# Patient Record
Sex: FEMALE | Race: WHITE | Employment: PART TIME | URBAN - METROPOLITAN AREA
[De-identification: names, ages, dates, MRNs, and addresses within clinical notes are randomized per-mention and may not be internally consistent; named-entity substitution may affect disease eponyms.]

---

## 2017-02-21 ENCOUNTER — TELEPHONE (OUTPATIENT)
Dept: PULMONOLOGY | Age: 37
End: 2017-02-21

## 2017-02-23 ENCOUNTER — OFFICE VISIT (OUTPATIENT)
Dept: PULMONOLOGY | Age: 37
End: 2017-02-23

## 2017-02-23 VITALS
OXYGEN SATURATION: 95 % | HEIGHT: 72 IN | SYSTOLIC BLOOD PRESSURE: 102 MMHG | BODY MASS INDEX: 33 KG/M2 | HEART RATE: 82 BPM | DIASTOLIC BLOOD PRESSURE: 60 MMHG | WEIGHT: 243.6 LBS | RESPIRATION RATE: 16 BRPM | TEMPERATURE: 98.2 F

## 2017-02-23 DIAGNOSIS — G47.33 OSA (OBSTRUCTIVE SLEEP APNEA): ICD-10-CM

## 2017-02-23 DIAGNOSIS — I27.20 PULMONARY HYPERTENSION (HCC): ICD-10-CM

## 2017-02-23 DIAGNOSIS — R94.2 ABNORMAL PFTS: ICD-10-CM

## 2017-02-23 DIAGNOSIS — J45.20 MILD INTERMITTENT ASTHMA WITHOUT COMPLICATION: Primary | ICD-10-CM

## 2017-02-23 DIAGNOSIS — Z72.0 TOBACCO USE: ICD-10-CM

## 2017-02-23 DIAGNOSIS — J96.11 CHRONIC RESPIRATORY FAILURE WITH HYPOXIA (HCC): ICD-10-CM

## 2017-02-23 PROCEDURE — G8417 CALC BMI ABV UP PARAM F/U: HCPCS | Performed by: INTERNAL MEDICINE

## 2017-02-23 PROCEDURE — G8484 FLU IMMUNIZE NO ADMIN: HCPCS | Performed by: INTERNAL MEDICINE

## 2017-02-23 PROCEDURE — G8427 DOCREV CUR MEDS BY ELIG CLIN: HCPCS | Performed by: INTERNAL MEDICINE

## 2017-02-23 PROCEDURE — 99214 OFFICE O/P EST MOD 30 MIN: CPT | Performed by: INTERNAL MEDICINE

## 2017-02-23 PROCEDURE — 4004F PT TOBACCO SCREEN RCVD TLK: CPT | Performed by: INTERNAL MEDICINE

## 2017-02-28 ENCOUNTER — HOSPITAL ENCOUNTER (OUTPATIENT)
Dept: NON INVASIVE DIAGNOSTICS | Age: 37
Discharge: HOME | End: 2017-02-28
Attending: INTERNAL MEDICINE | Admitting: INTERNAL MEDICINE

## 2017-02-28 DIAGNOSIS — I27.29 OTHER SECONDARY PULMONARY HYPERTENSION (HCC): ICD-10-CM

## 2017-02-28 LAB
LV EF: 58 %
LVEF MODALITY: NORMAL

## 2017-03-02 ENCOUNTER — OFFICE VISIT (OUTPATIENT)
Dept: CARDIOLOGY CLINIC | Age: 37
End: 2017-03-02

## 2017-03-02 VITALS
WEIGHT: 245.4 LBS | OXYGEN SATURATION: 93 % | BODY MASS INDEX: 33.24 KG/M2 | SYSTOLIC BLOOD PRESSURE: 110 MMHG | HEIGHT: 72 IN | DIASTOLIC BLOOD PRESSURE: 72 MMHG | HEART RATE: 75 BPM

## 2017-03-02 DIAGNOSIS — I47.1 SVT (SUPRAVENTRICULAR TACHYCARDIA) (HCC): ICD-10-CM

## 2017-03-02 DIAGNOSIS — J96.11 CHRONIC RESPIRATORY FAILURE WITH HYPOXIA (HCC): Primary | ICD-10-CM

## 2017-03-02 DIAGNOSIS — R00.0 TACHYCARDIA: ICD-10-CM

## 2017-03-02 PROCEDURE — G8427 DOCREV CUR MEDS BY ELIG CLIN: HCPCS | Performed by: INTERNAL MEDICINE

## 2017-03-02 PROCEDURE — G8417 CALC BMI ABV UP PARAM F/U: HCPCS | Performed by: INTERNAL MEDICINE

## 2017-03-02 PROCEDURE — 93000 ELECTROCARDIOGRAM COMPLETE: CPT | Performed by: INTERNAL MEDICINE

## 2017-03-02 PROCEDURE — 4004F PT TOBACCO SCREEN RCVD TLK: CPT | Performed by: INTERNAL MEDICINE

## 2017-03-02 PROCEDURE — 99214 OFFICE O/P EST MOD 30 MIN: CPT | Performed by: INTERNAL MEDICINE

## 2017-03-02 PROCEDURE — G8484 FLU IMMUNIZE NO ADMIN: HCPCS | Performed by: INTERNAL MEDICINE

## 2017-05-25 ENCOUNTER — OFFICE VISIT (OUTPATIENT)
Dept: PULMONOLOGY | Age: 37
End: 2017-05-25

## 2017-05-25 VITALS
OXYGEN SATURATION: 92 % | SYSTOLIC BLOOD PRESSURE: 112 MMHG | BODY MASS INDEX: 33.46 KG/M2 | HEART RATE: 72 BPM | WEIGHT: 247 LBS | DIASTOLIC BLOOD PRESSURE: 80 MMHG | HEIGHT: 72 IN | RESPIRATION RATE: 16 BRPM | TEMPERATURE: 98.4 F

## 2017-05-25 DIAGNOSIS — G47.33 OSA ON CPAP: Primary | ICD-10-CM

## 2017-05-25 DIAGNOSIS — Z99.89 OSA ON CPAP: Primary | ICD-10-CM

## 2017-05-25 PROCEDURE — G8427 DOCREV CUR MEDS BY ELIG CLIN: HCPCS | Performed by: INTERNAL MEDICINE

## 2017-05-25 PROCEDURE — 99213 OFFICE O/P EST LOW 20 MIN: CPT | Performed by: INTERNAL MEDICINE

## 2017-05-25 PROCEDURE — 4004F PT TOBACCO SCREEN RCVD TLK: CPT | Performed by: INTERNAL MEDICINE

## 2017-05-25 PROCEDURE — G8417 CALC BMI ABV UP PARAM F/U: HCPCS | Performed by: INTERNAL MEDICINE

## 2017-05-25 ASSESSMENT — SLEEP AND FATIGUE QUESTIONNAIRES
HOW LIKELY ARE YOU TO NOD OFF OR FALL ASLEEP WHILE LYING DOWN TO REST IN THE AFTERNOON WHEN CIRCUMSTANCES PERMIT: 0
HOW LIKELY ARE YOU TO NOD OFF OR FALL ASLEEP IN A CAR, WHILE STOPPED FOR A FEW MINUTES IN TRAFFIC: 0
HOW LIKELY ARE YOU TO NOD OFF OR FALL ASLEEP WHILE SITTING AND TALKING TO SOMEONE: 0
HOW LIKELY ARE YOU TO NOD OFF OR FALL ASLEEP WHEN YOU ARE A PASSENGER IN A CAR FOR AN HOUR WITHOUT A BREAK: 0
ESS TOTAL SCORE: 0
HOW LIKELY ARE YOU TO NOD OFF OR FALL ASLEEP WHILE SITTING AND READING: 0
HOW LIKELY ARE YOU TO NOD OFF OR FALL ASLEEP WHILE WATCHING TV: 0
HOW LIKELY ARE YOU TO NOD OFF OR FALL ASLEEP WHILE SITTING INACTIVE IN A PUBLIC PLACE: 0
HOW LIKELY ARE YOU TO NOD OFF OR FALL ASLEEP WHILE SITTING QUIETLY AFTER LUNCH WITHOUT ALCOHOL: 0

## 2017-05-30 ENCOUNTER — OFFICE VISIT (OUTPATIENT)
Dept: PULMONOLOGY | Age: 37
End: 2017-05-30

## 2017-05-30 VITALS
OXYGEN SATURATION: 94 % | HEART RATE: 90 BPM | WEIGHT: 245.6 LBS | HEIGHT: 72 IN | RESPIRATION RATE: 16 BRPM | BODY MASS INDEX: 33.26 KG/M2 | DIASTOLIC BLOOD PRESSURE: 68 MMHG | TEMPERATURE: 98.5 F | SYSTOLIC BLOOD PRESSURE: 92 MMHG

## 2017-05-30 DIAGNOSIS — I27.20 PULMONARY HYPERTENSION (HCC): ICD-10-CM

## 2017-05-30 DIAGNOSIS — J45.20 MILD INTERMITTENT ASTHMA WITHOUT COMPLICATION: Primary | ICD-10-CM

## 2017-05-30 DIAGNOSIS — R94.2 ABNORMAL PFTS: ICD-10-CM

## 2017-05-30 DIAGNOSIS — Z72.0 TOBACCO USE: ICD-10-CM

## 2017-05-30 DIAGNOSIS — J96.11 CHRONIC RESPIRATORY FAILURE WITH HYPOXIA (HCC): ICD-10-CM

## 2017-05-30 PROCEDURE — G8417 CALC BMI ABV UP PARAM F/U: HCPCS | Performed by: INTERNAL MEDICINE

## 2017-05-30 PROCEDURE — 99214 OFFICE O/P EST MOD 30 MIN: CPT | Performed by: INTERNAL MEDICINE

## 2017-05-30 PROCEDURE — G8427 DOCREV CUR MEDS BY ELIG CLIN: HCPCS | Performed by: INTERNAL MEDICINE

## 2017-05-30 PROCEDURE — 4004F PT TOBACCO SCREEN RCVD TLK: CPT | Performed by: INTERNAL MEDICINE

## 2017-10-03 ENCOUNTER — OFFICE VISIT (OUTPATIENT)
Dept: PULMONOLOGY | Age: 37
End: 2017-10-03

## 2017-10-03 ENCOUNTER — TELEPHONE (OUTPATIENT)
Dept: PULMONOLOGY | Age: 37
End: 2017-10-03

## 2017-10-03 VITALS
HEART RATE: 80 BPM | RESPIRATION RATE: 16 BRPM | WEIGHT: 242 LBS | BODY MASS INDEX: 32.78 KG/M2 | TEMPERATURE: 97.8 F | HEIGHT: 72 IN | OXYGEN SATURATION: 94 % | SYSTOLIC BLOOD PRESSURE: 113 MMHG | DIASTOLIC BLOOD PRESSURE: 66 MMHG

## 2017-10-03 DIAGNOSIS — J96.11 CHRONIC RESPIRATORY FAILURE WITH HYPOXIA (HCC): ICD-10-CM

## 2017-10-03 DIAGNOSIS — I27.20 PULMONARY HYPERTENSION (HCC): ICD-10-CM

## 2017-10-03 DIAGNOSIS — J45.20 MILD INTERMITTENT ASTHMA WITHOUT COMPLICATION: Primary | ICD-10-CM

## 2017-10-03 DIAGNOSIS — Z72.0 TOBACCO USE: ICD-10-CM

## 2017-10-03 DIAGNOSIS — Z23 NEED FOR INFLUENZA VACCINATION: ICD-10-CM

## 2017-10-03 PROCEDURE — 99214 OFFICE O/P EST MOD 30 MIN: CPT | Performed by: INTERNAL MEDICINE

## 2017-10-03 PROCEDURE — 4004F PT TOBACCO SCREEN RCVD TLK: CPT | Performed by: INTERNAL MEDICINE

## 2017-10-03 PROCEDURE — 94620 PR PULMONARY STRESS TESTING,SIMPLE: CPT | Performed by: INTERNAL MEDICINE

## 2017-10-03 PROCEDURE — 90688 IIV4 VACCINE SPLT 0.5 ML IM: CPT | Performed by: INTERNAL MEDICINE

## 2017-10-03 PROCEDURE — G8484 FLU IMMUNIZE NO ADMIN: HCPCS | Performed by: INTERNAL MEDICINE

## 2017-10-03 PROCEDURE — G8427 DOCREV CUR MEDS BY ELIG CLIN: HCPCS | Performed by: INTERNAL MEDICINE

## 2017-10-03 PROCEDURE — G8417 CALC BMI ABV UP PARAM F/U: HCPCS | Performed by: INTERNAL MEDICINE

## 2017-10-03 PROCEDURE — G0008 ADMIN INFLUENZA VIRUS VAC: HCPCS | Performed by: INTERNAL MEDICINE

## 2017-10-03 NOTE — MR AVS SNAPSHOT
After Visit Summary             Brissa Douglass   10/3/2017 1:40 PM   Office Visit    Description:  Female : 1980   Provider:  Vern Lau MD   Department:  [facility] Pulmonology Sleep and Critical Care              Your Follow-Up and Future Appointments         Below is a list of your follow-up and future appointments. This may not be a complete list as you may have made appointments directly with providers that we are not aware of or your providers may have made some for you. Please call your providers to confirm appointments. It is important to keep your appointments. Please bring your current insurance card, photo ID, co-pay, and all medication bottles to your appointment. If self-pay, payment is expected at the time of service. Your To-Do List     Future Appointments Provider Department Dept Phone    2017 2:00 PM Gary Bagley [facility] Pulmonology Sleep and Critical Care [phone]    Please arrive 15 minutes prior to appointment, bring photo ID and insurance card. Future Orders Complete By Expires    ECHO Complete 2D W Doppler W Color [[id number] Custom]  2/3/2018 10/3/2018         Information from Your Visit        Department     Name Address Phone Fax    [facility] Pulmonology Sleep and [facility]. [address] [place] [address] Road      You Were Seen for:         Comments    Need for influenza vaccination   [[id number]]         Vital Signs     Blood Pressure Pulse Temperature Respirations Height Weight    113/66 (Site: Left Arm, Position: Sitting, Cuff Size: Medium Adult) 80 97.8 °F (36.6 °C) (Oral) 16 6' (1.829 m) 242 lb (109.8 kg)    Last Menstrual Period Oxygen Saturation Breastfeeding?  Body Mass Index Smoking Status       2017 94% No 32.82 kg/m2 Current Every Day Smoker       Additional Information about your Body Mass Index (BMI) levothyroxine (SYNTHROID) 75 MCG tablet Take 75 mcg by mouth Daily. Calcium-Vitamin D-Vitamin K (VIACTIV PO) Take 1 tablet by mouth 3 times daily. divalproex (DEPAKOTE) 500 MG ER tablet Take 1,000 mg by mouth nightly. budesonide-formoterol (SYMBICORT) 80-4.5 MCG/ACT AERO Inhale 2 puffs into the lungs 2 times daily. FLUoxetine (PROZAC) 20 MG capsule Take 20 mg by mouth daily. Allergies              Diphenoxylate-atropine     Lamictal [Lamotrigine]     Pcn [Penicillins]       We Ordered/Performed the following           INFLUENZA, QUADV, 3 YRS AND OLDER, IM, MDV, 0.5ML (FLUZONE QUADV)          Additional Information        Basic Information     Date Of Birth Sex Race Ethnicity Preferred Language    1980 Female White Non-/Non  English      Problem List as of 10/3/2017  Date Reviewed: 3/2/2017                Chronic respiratory failure with hypoxia (HCC)    FREIDA (obstructive sleep apnea)    Muscle weakness    Shortness of breath    Chondromalacia patella, right    Osteonecrosis of right knee region ([address]    Abnormal PFTs    Hypoxia    Mild intermittent asthma without complication    Encounter to establish care with new doctor    Abnormal radiograph    Diabetes ([address]    COPD (chronic obstructive pulmonary disease) ([facility]    Scoliosis      Immunizations as of 10/3/2017     Name Date    Hepatitis B 9/22/2011    Influenza Vaccine, unspecified formulation 9/22/2011, 11/3/2009    Influenza Virus Vaccine 9/24/2015, 10/7/2014, 10/29/2013    Influenza, [place] Avila, 3 Years and older, IM 10/26/2016    Pneumococcal Polysaccharide ([id number]) 10/27/2015    Td 9/22/2011      Preventive Care        Date Due    Diabetic Foot Exam 1/18/1990    Eye Exam By An Eye Doctor 1/18/1990    Cholesterol Screening 1/18/1990    HIV screening is recommended for all people regardless of risk factors  aged 15-65 years at least once (lifetime) who have never been HIV tested.  1/18/1995 Urine Check For Kidney Problems 1/18/1998    Pap Smear 1/18/2001    Tetanus Combination Vaccine (1 - Tdap) 9/23/2011    Hemoglobin A1C (Test For Long-Term Glucose Control) 9/27/2016    Yearly Flu Vaccine (1) 9/1/2017            MyChart Signup           Our records indicate that you have an active Where[employer] account. You can view your After Visit Summary by going to [email or web address] and logging in with your OpenSpan username and password. If you don't have a OpenSpan username and password but a parent or guardian has access to your record, the parent or guardian should login with their own OpenSpan username and password and access your record to view the After Visit Summary. Additional Information  If you have questions, please contact the physician practice where you receive care. Remember, OpenSpan is NOT to be used for urgent needs. For medical emergencies, dial 911. For questions regarding your [employer]TV[employer] account call [phone]. If you have a clinical question, please call your doctor's office.

## 2017-10-03 NOTE — PROGRESS NOTES
Chief complaint  This is a 40y.o. year old female  who presents with a chief complaint of   Chief Complaint   Patient presents with    Follow-up     SOB       HPI  55-year-old woman with asthma, pulmonary hypertension and obstructive sleep apnea (on CPAP) presents for follow-up. She continues to work at [employer]. She denies shortness of breath with activities. She denies cough. She uses Symbicort twice a day and Atrovent 4 times a day scheduled. She takes Lasix 20 mg daily. Past medical history:   She was admitted to [facility] from 9/23-9/30/15 for asthma exacerbation. She was treated with steroids, nebulizers, antibiotics and pulmonary toilet. Chest imaging showed atelectasis involving most lobes. There was no evidence of pulmonary embolus. She did have a significant oxygen requirement- needing up to 10L NC. This was eventually weaned to 2L. She was discharged home with this.      Past Medical History:   Diagnosis Date    Asthma     Atrial fibrillation (HCC)     Conduct disorder          Diabetes mellitus ([facility]     Diabetes mellitus, type 2 ([facility]     Hypothyroidism     Pneumonia     Scoliosis     Scoliosis 1995    Sleep apnea     CPAP   Bipolar disorder  Schizophrenia  Intermittent explosive disorder  Mental retardation    Past Surgical History:   Procedure Laterality Date    APPENDECTOMY      KNEE ARTHROSCOPY Right 07/01/2016    LUNG SURGERY         Current Outpatient Prescriptions   Medication Sig Dispense Refill    furosemide (LASIX) 20 MG tablet Take 1 tablet by mouth daily 30 tablet 0    promethazine (PHENERGAN) 25 MG tablet Take 1 tablet by mouth every 6 hours as needed for Nausea 5 tablet 0    aspirin 81 MG chewable tablet Take 81 mg by mouth daily      loratadine (CLARITIN) 10 MG tablet Take 10 mg by mouth daily      CYCLAFEM 1/35 1-35 MG-MCG per tablet 1 tablet daily      LORazepam (ATIVAN) 0.5 MG tablet 1 tablet every morning (before breakfast)      metoprolol (LOPRESSOR) 25 MG tablet TAKE ONE (1) TABLET BY MOUTH TWICE A DAY FOR TACHYCARDIA 60 tablet 6    ipratropium (ATROVENT HFA) 17 MCG/ACT inhaler Inhale 2 puffs into the lungs 4 times daily.  therapeutic multivitamin-minerals (THERAGRAN-M) tablet Take 1 tablet by mouth daily.  metFORMIN (GLUCOPHAGE) 500 MG tablet Take 500 mg by mouth 2 times daily (with meals).  docusate sodium (COLACE) 100 MG capsule Take 100 mg by mouth daily.  cloZAPine (CLOZARIL) 25 MG tablet Take 25 mg by mouth daily.  topiramate (TOPAMAX) 50 MG tablet Take 50 mg by mouth nightly.  benztropine (COGENTIN) 0.5 MG tablet Take 0.5 mg by mouth daily.  levothyroxine (SYNTHROID) 75 MCG tablet Take 75 mcg by mouth Daily.  Calcium-Vitamin D-Vitamin K (VIACTIV PO) Take 1 tablet by mouth 3 times daily.  divalproex (DEPAKOTE) 500 MG ER tablet Take 1,000 mg by mouth nightly.  budesonide-formoterol (SYMBICORT) 80-4.5 MCG/ACT AERO Inhale 2 puffs into the lungs 2 times daily.  FLUoxetine (PROZAC) 20 MG capsule Take 20 mg by mouth daily. No current facility-administered medications for this visit. Allergies   Allergen Reactions    Diphenoxylate-Atropine     Lamictal [Lamotrigine]     Pcn [Penicillins]        Family History   Problem Relation Age of Onset    Heart Failure Mother     Depression Father     Emphysema Paternal Grandfather     Liver Disease Maternal Aunt     Diabetes Paternal Grandmother        Social History     Social History    Marital status: Single     Spouse name: N/A    Number of children: N/A    Years of education: N/A     Occupational History    Not on file.      Social History Main Topics    Smoking status: Current Every Day Smoker     Packs/day: 0.50     Years: 15.00     Types: Cigarettes    Smokeless tobacco: Never Used      Comment: 8 cigs per day ( smokes 1 cigs q 2h    Alcohol use No    Drug use: No    Sexual activity: Not Currently     Other Topics Concern    Not on file Social History Narrative       Immunization History   Administered Date(s) Administered    Hepatitis B, unspecified formulation 09/22/2011    Influenza Vaccine, unspecified formulation 11/03/2009, 09/22/2011    Influenza Virus Vaccine 10/29/2013, 10/07/2014, 09/24/2015    Influenza, [place]aila Gonzalez, 3 Years and older, IM 10/26/2016, 10/03/2017    Pneumococcal Polysaccharide ([id number]) 10/27/2015    Td 09/22/2011       ROS:  GENERAL:  No fevers, no chills  RESPIRATORY:  No shortness of breath, no wheezing, no cough      PHYSICAL EXAM:  Vitals:    10/03/17 1359   BP: 113/66   Pulse: 80   Resp: 16   Temp: 97.8 °F (36.6 °C)   SpO2: 94%   on RA    Gen: Well developed; well nourished  Eyes: No scleral icterus. No conjunctival injection. ENT:  Oropharynx clear. External appearance of ears and nose normal.  Neck: Trachea midline. No obvious mass. No visible thyroid enlargement    Respiratory: Clear to auscultation bilaterally, no accessory muscle use  Cardiovascular: Regular rate and rhythm, no appreciable murmurs. No edema. Gastrointestinal: Soft, non-tender. No hernia  Skin: Warm and dry. No rashes or ulcers on visible areas. Normal texture and turgor  Lymphatic: No cervical LAD. No supraclavicular LAD. Musculoskeletal: No cyanosis, clubbing or joint deformity. Psychiatric: Normal mood and affect; exhibits normal insight and judgement     Pulmonary Function Testing (8/31/16)  FVC 2.12 L at 47% predicted ---> 2.4L at 53% predicted  FEV1 1.52 L at 41% predicted ----> 1.77L at 46% predicted  FEV1/FVC ratio at 72% ---->74% predicted  TLC 3.83 L at 64% predicted  VC 2.28L at 52% predicted  ERV 0.43L at 30% predicted  RV/TLC at 40% predicted  DLCO 19.1 at 71% predicted  DLCO/VA 6.52L at 172 % predicted      MIP -30cm H2O  MEP 77cm H2O      Overall: Combined obstructive and restrictive ventilatory defects. The obstruction appears to be mild. There is significant reversal with bronchodilators. Restriction is moderate. 43/17 and a mean    pulmonary arterial pressure of 29 mmHg. It was difficult for the patient to    cooperate with breathing instructions and there was somewhat of a fluctuation    in the mean pressures. I do think that the mean pressure of 29 mmHg is    accurate.    3. Normal oxygen saturations in the superior vena cava at 75% and right    atrium at 68%. The pulmonary arterial oxygen saturation was 73%.    4. Normal cardiac output by thermodilution at 5.79 liters per minute with a    cardiac index of 2.46 liters per kilogram per minute. By [place] Jared the    cardiac output is 7.52 liters per minute with a cardiac index of 3.2 liters    per kilogram per minute. Pulmonary vascular resistance is 193. Lab Results   Component Value Date    WBC 7.7 09/15/2016    HGB 15.2 09/15/2016    HCT 46.1 09/15/2016    MCV 86.4 09/15/2016     09/15/2016       No results found for: BNP    Lab Results   Component Value Date    CREATININE 0.7 09/15/2016    BUN 14 09/15/2016     09/15/2016    K 4.5 09/15/2016    CL 94 (L) 09/15/2016    CO2 31 09/15/2016         Assessment/Plan:40y.o. year old female presents for follow up. Asthma- She will continue Symbicort and Atrovent. Pulmonary hypertension- She will continue Lasix 20 mg daily. She has regular blood tests through her primary care physician. Will obtain echocardiogram in February 2018 (one year follow up). Chronic hypoxic respiratory failure- She has required supplemental oxygen since September 2015. On 6 minute walk test today she did not desaturate below 90%. Advised that she does not need to continue supplemental oxygen. Obstructive sleep apnea- On CPAP. Follows with Dr. Janna Galan. Tobacco use- We discussed the importance of smoking cessation. She is not ready to quit. Need for influenza vaccination- She received the flu vaccine today. She will return for follow-up in 4 months.       Gerry Sweeney MD  [facility] Pulmonology, Critical Care and Sleep

## 2017-10-04 NOTE — TELEPHONE ENCOUNTER
Spoke to Gabe Dancer in Medical Records at Dr Simons [facility] office. Requested the last 3 blood draws ordered by Dr Delfin Eldridge. She said the last blood work ordered by Dr Delfin Eldridge was 4/13/17. I asked that she fax that result and 2 prior. She took the fax number and said she would check with her manager about faxing.

## 2017-10-05 ENCOUNTER — TELEPHONE (OUTPATIENT)
Dept: PULMONOLOGY | Age: 37
End: 2017-10-05

## 2017-10-05 NOTE — TELEPHONE ENCOUNTER
Left message with Keli Cueto at [address] [place] [address]. Asked if Dr Kandace Pena had done a Chem 7 on [place] since January.   She will forward the message and call back with answer

## 2017-11-08 ENCOUNTER — TELEPHONE (OUTPATIENT)
Dept: PULMONOLOGY | Age: 37
End: 2017-11-08

## 2017-12-19 ENCOUNTER — OFFICE VISIT (OUTPATIENT)
Dept: PULMONOLOGY | Age: 37
End: 2017-12-19

## 2017-12-19 VITALS
RESPIRATION RATE: 16 BRPM | WEIGHT: 239 LBS | TEMPERATURE: 97.9 F | DIASTOLIC BLOOD PRESSURE: 70 MMHG | HEIGHT: 72 IN | OXYGEN SATURATION: 93 % | SYSTOLIC BLOOD PRESSURE: 113 MMHG | HEART RATE: 82 BPM | BODY MASS INDEX: 32.37 KG/M2

## 2017-12-19 DIAGNOSIS — G47.33 OSA ON CPAP: Primary | ICD-10-CM

## 2017-12-19 DIAGNOSIS — Z99.89 OSA ON CPAP: Primary | ICD-10-CM

## 2017-12-19 PROCEDURE — G8417 CALC BMI ABV UP PARAM F/U: HCPCS | Performed by: INTERNAL MEDICINE

## 2017-12-19 PROCEDURE — G8484 FLU IMMUNIZE NO ADMIN: HCPCS | Performed by: INTERNAL MEDICINE

## 2017-12-19 PROCEDURE — 4004F PT TOBACCO SCREEN RCVD TLK: CPT | Performed by: INTERNAL MEDICINE

## 2017-12-19 PROCEDURE — G8427 DOCREV CUR MEDS BY ELIG CLIN: HCPCS | Performed by: INTERNAL MEDICINE

## 2017-12-19 PROCEDURE — 99213 OFFICE O/P EST LOW 20 MIN: CPT | Performed by: INTERNAL MEDICINE

## 2017-12-19 ASSESSMENT — SLEEP AND FATIGUE QUESTIONNAIRES
HOW LIKELY ARE YOU TO NOD OFF OR FALL ASLEEP WHILE SITTING AND TALKING TO SOMEONE: 0
HOW LIKELY ARE YOU TO NOD OFF OR FALL ASLEEP IN A CAR, WHILE STOPPED FOR A FEW MINUTES IN TRAFFIC: 0
HOW LIKELY ARE YOU TO NOD OFF OR FALL ASLEEP WHILE WATCHING TV: 0
HOW LIKELY ARE YOU TO NOD OFF OR FALL ASLEEP WHILE SITTING QUIETLY AFTER LUNCH WITHOUT ALCOHOL: 0
HOW LIKELY ARE YOU TO NOD OFF OR FALL ASLEEP WHILE SITTING AND READING: 0
HOW LIKELY ARE YOU TO NOD OFF OR FALL ASLEEP WHILE LYING DOWN TO REST IN THE AFTERNOON WHEN CIRCUMSTANCES PERMIT: 0
ESS TOTAL SCORE: 1
HOW LIKELY ARE YOU TO NOD OFF OR FALL ASLEEP WHEN YOU ARE A PASSENGER IN A CAR FOR AN HOUR WITHOUT A BREAK: 1
HOW LIKELY ARE YOU TO NOD OFF OR FALL ASLEEP WHILE SITTING INACTIVE IN A PUBLIC PLACE: 0

## 2017-12-19 NOTE — PROGRESS NOTES
None     Social History Narrative    None       PHYSICAL EXAM:  GENERAL:  Well nourished, alert, appears stated age, no distress, Cushingoid,  HEENT:  No scleral icterus, no conjunctival irritation, Mallampati IV, narrow pharynx  NECK:  No thyromegaly, no bruits  LYMPH:  No cervical or supraclavicular adenopathy  HEART:  Regular rate and rhythm, no murmurs  LUNGS:  Clear but diminished  ABDOMEN:  No distention, no organomegaly  EXTREMITIES:  No edema, no digital clubbing  NEURO:  No localizing deficits, CN II-XII intact      Lab Results   Component Value Date    WBC 7.7 09/15/2016    HGB 15.2 09/15/2016    HCT 46.1 09/15/2016    MCV 86.4 09/15/2016     09/15/2016       No results found for: BNP    Lab Results   Component Value Date    CREATININE 0.7 09/15/2016    BUN 14 09/15/2016     09/15/2016    K 4.5 09/15/2016    CL 94 (L) 09/15/2016    CO2 31 09/15/2016           Assessment/Plan:  1. FREIDA on CPAP    Benefiting and compliant.   Benefiting from therapy by a low Buckley score, good energy levels, low fatigue, and control of chronic medical problems      Follow up in 6 months

## 2018-03-20 ENCOUNTER — HOSPITAL ENCOUNTER (OUTPATIENT)
Dept: NON INVASIVE DIAGNOSTICS | Age: 38
Discharge: HOME | End: 2018-03-20
Attending: INTERNAL MEDICINE | Admitting: INTERNAL MEDICINE

## 2018-03-20 DIAGNOSIS — J96.11 CHRONIC RESPIRATORY FAILURE WITH HYPOXIA (HCC): ICD-10-CM

## 2018-03-20 LAB
LV EF: 63 %
LVEF MODALITY: NORMAL

## 2018-05-23 ENCOUNTER — OFFICE VISIT (OUTPATIENT)
Dept: PULMONOLOGY | Age: 38
End: 2018-05-23

## 2018-05-23 VITALS
SYSTOLIC BLOOD PRESSURE: 108 MMHG | WEIGHT: 238.2 LBS | BODY MASS INDEX: 32.26 KG/M2 | HEART RATE: 75 BPM | HEIGHT: 72 IN | DIASTOLIC BLOOD PRESSURE: 66 MMHG | TEMPERATURE: 97.9 F | OXYGEN SATURATION: 95 % | RESPIRATION RATE: 16 BRPM

## 2018-05-23 DIAGNOSIS — G47.33 OSA (OBSTRUCTIVE SLEEP APNEA): ICD-10-CM

## 2018-05-23 DIAGNOSIS — J45.20 MILD INTERMITTENT ASTHMA WITHOUT COMPLICATION: Primary | ICD-10-CM

## 2018-05-23 DIAGNOSIS — I27.20 PULMONARY HYPERTENSION (HCC): ICD-10-CM

## 2018-05-23 DIAGNOSIS — Z72.0 TOBACCO USE: ICD-10-CM

## 2018-05-23 PROCEDURE — G8427 DOCREV CUR MEDS BY ELIG CLIN: HCPCS | Performed by: INTERNAL MEDICINE

## 2018-05-23 PROCEDURE — G8417 CALC BMI ABV UP PARAM F/U: HCPCS | Performed by: INTERNAL MEDICINE

## 2018-05-23 PROCEDURE — 99214 OFFICE O/P EST MOD 30 MIN: CPT | Performed by: INTERNAL MEDICINE

## 2018-05-23 PROCEDURE — 4004F PT TOBACCO SCREEN RCVD TLK: CPT | Performed by: INTERNAL MEDICINE

## 2018-05-24 ENCOUNTER — TELEPHONE (OUTPATIENT)
Dept: PULMONOLOGY | Age: 38
End: 2018-05-24

## 2018-05-24 DIAGNOSIS — Z79.899 HIGH RISK MEDICATION USE: Primary | ICD-10-CM

## 2018-06-11 ENCOUNTER — TELEPHONE (OUTPATIENT)
Dept: PULMONOLOGY | Age: 38
End: 2018-06-11

## 2018-06-19 ENCOUNTER — OFFICE VISIT (OUTPATIENT)
Dept: PULMONOLOGY | Age: 38
End: 2018-06-19

## 2018-06-19 VITALS
SYSTOLIC BLOOD PRESSURE: 105 MMHG | RESPIRATION RATE: 16 BRPM | DIASTOLIC BLOOD PRESSURE: 69 MMHG | WEIGHT: 238 LBS | BODY MASS INDEX: 32.23 KG/M2 | TEMPERATURE: 97.9 F | HEIGHT: 72 IN | OXYGEN SATURATION: 93 % | HEART RATE: 74 BPM

## 2018-06-19 DIAGNOSIS — Z99.89 OSA ON CPAP: Primary | ICD-10-CM

## 2018-06-19 DIAGNOSIS — G47.33 OSA ON CPAP: Primary | ICD-10-CM

## 2018-06-19 PROCEDURE — G8417 CALC BMI ABV UP PARAM F/U: HCPCS | Performed by: INTERNAL MEDICINE

## 2018-06-19 PROCEDURE — 4004F PT TOBACCO SCREEN RCVD TLK: CPT | Performed by: INTERNAL MEDICINE

## 2018-06-19 PROCEDURE — 99213 OFFICE O/P EST LOW 20 MIN: CPT | Performed by: INTERNAL MEDICINE

## 2018-06-19 PROCEDURE — G8427 DOCREV CUR MEDS BY ELIG CLIN: HCPCS | Performed by: INTERNAL MEDICINE

## 2018-06-19 ASSESSMENT — SLEEP AND FATIGUE QUESTIONNAIRES
HOW LIKELY ARE YOU TO NOD OFF OR FALL ASLEEP WHILE SITTING AND TALKING TO SOMEONE: 1
HOW LIKELY ARE YOU TO NOD OFF OR FALL ASLEEP WHILE WATCHING TV: 1
HOW LIKELY ARE YOU TO NOD OFF OR FALL ASLEEP WHEN YOU ARE A PASSENGER IN A CAR FOR AN HOUR WITHOUT A BREAK: 1
HOW LIKELY ARE YOU TO NOD OFF OR FALL ASLEEP WHILE SITTING QUIETLY AFTER LUNCH WITHOUT ALCOHOL: 1
HOW LIKELY ARE YOU TO NOD OFF OR FALL ASLEEP WHILE SITTING AND READING: 1
HOW LIKELY ARE YOU TO NOD OFF OR FALL ASLEEP WHILE LYING DOWN TO REST IN THE AFTERNOON WHEN CIRCUMSTANCES PERMIT: 1
HOW LIKELY ARE YOU TO NOD OFF OR FALL ASLEEP IN A CAR, WHILE STOPPED FOR A FEW MINUTES IN TRAFFIC: 1
ESS TOTAL SCORE: 8
HOW LIKELY ARE YOU TO NOD OFF OR FALL ASLEEP WHILE SITTING INACTIVE IN A PUBLIC PLACE: 1

## 2018-08-15 ENCOUNTER — TELEPHONE (OUTPATIENT)
Dept: PULMONOLOGY | Age: 38
End: 2018-08-15

## 2019-01-03 ENCOUNTER — OFFICE VISIT (OUTPATIENT)
Dept: PULMONOLOGY | Age: 39
End: 2019-01-03
Payer: MEDICARE

## 2019-01-03 VITALS
TEMPERATURE: 97 F | DIASTOLIC BLOOD PRESSURE: 72 MMHG | BODY MASS INDEX: 31.56 KG/M2 | RESPIRATION RATE: 16 BRPM | HEART RATE: 69 BPM | WEIGHT: 233 LBS | OXYGEN SATURATION: 95 % | SYSTOLIC BLOOD PRESSURE: 119 MMHG | HEIGHT: 72 IN

## 2019-01-03 DIAGNOSIS — G47.33 OSA ON CPAP: Primary | ICD-10-CM

## 2019-01-03 DIAGNOSIS — Z99.89 OSA ON CPAP: Primary | ICD-10-CM

## 2019-01-03 PROCEDURE — G8484 FLU IMMUNIZE NO ADMIN: HCPCS | Performed by: INTERNAL MEDICINE

## 2019-01-03 PROCEDURE — G8417 CALC BMI ABV UP PARAM F/U: HCPCS | Performed by: INTERNAL MEDICINE

## 2019-01-03 PROCEDURE — G8427 DOCREV CUR MEDS BY ELIG CLIN: HCPCS | Performed by: INTERNAL MEDICINE

## 2019-01-03 PROCEDURE — 4004F PT TOBACCO SCREEN RCVD TLK: CPT | Performed by: INTERNAL MEDICINE

## 2019-01-03 PROCEDURE — 99213 OFFICE O/P EST LOW 20 MIN: CPT | Performed by: INTERNAL MEDICINE

## 2019-01-03 ASSESSMENT — SLEEP AND FATIGUE QUESTIONNAIRES
HOW LIKELY ARE YOU TO NOD OFF OR FALL ASLEEP WHILE WATCHING TV: 1
HOW LIKELY ARE YOU TO NOD OFF OR FALL ASLEEP IN A CAR, WHILE STOPPED FOR A FEW MINUTES IN TRAFFIC: 1
ESS TOTAL SCORE: 8
HOW LIKELY ARE YOU TO NOD OFF OR FALL ASLEEP WHILE SITTING QUIETLY AFTER LUNCH WITHOUT ALCOHOL: 1
HOW LIKELY ARE YOU TO NOD OFF OR FALL ASLEEP WHILE LYING DOWN TO REST IN THE AFTERNOON WHEN CIRCUMSTANCES PERMIT: 1
HOW LIKELY ARE YOU TO NOD OFF OR FALL ASLEEP WHILE SITTING AND TALKING TO SOMEONE: 1
HOW LIKELY ARE YOU TO NOD OFF OR FALL ASLEEP WHILE SITTING INACTIVE IN A PUBLIC PLACE: 1
HOW LIKELY ARE YOU TO NOD OFF OR FALL ASLEEP WHILE SITTING AND READING: 1
HOW LIKELY ARE YOU TO NOD OFF OR FALL ASLEEP WHEN YOU ARE A PASSENGER IN A CAR FOR AN HOUR WITHOUT A BREAK: 1

## 2019-01-31 ENCOUNTER — OFFICE VISIT (OUTPATIENT)
Dept: PULMONOLOGY | Age: 39
End: 2019-01-31
Payer: MEDICARE

## 2019-01-31 VITALS
RESPIRATION RATE: 16 BRPM | SYSTOLIC BLOOD PRESSURE: 120 MMHG | DIASTOLIC BLOOD PRESSURE: 70 MMHG | OXYGEN SATURATION: 93 % | HEIGHT: 72 IN | HEART RATE: 75 BPM | BODY MASS INDEX: 32.45 KG/M2 | TEMPERATURE: 98 F | WEIGHT: 239.6 LBS

## 2019-01-31 DIAGNOSIS — J45.20 MILD INTERMITTENT ASTHMA WITHOUT COMPLICATION: ICD-10-CM

## 2019-01-31 DIAGNOSIS — Z72.0 TOBACCO USE: ICD-10-CM

## 2019-01-31 DIAGNOSIS — G47.33 OSA (OBSTRUCTIVE SLEEP APNEA): ICD-10-CM

## 2019-01-31 DIAGNOSIS — I27.20 PULMONARY HYPERTENSION (HCC): Primary | ICD-10-CM

## 2019-01-31 DIAGNOSIS — Z79.899 HIGH RISK MEDICATION USE: ICD-10-CM

## 2019-01-31 LAB
ANION GAP SERPL CALCULATED.3IONS-SCNC: 13 MMOL/L (ref 3–16)
BUN BLDV-MCNC: 9 MG/DL (ref 7–20)
CALCIUM SERPL-MCNC: 9 MG/DL (ref 8.3–10.6)
CHLORIDE BLD-SCNC: 102 MMOL/L (ref 99–110)
CO2: 27 MMOL/L (ref 21–32)
CREAT SERPL-MCNC: 0.7 MG/DL (ref 0.6–1.1)
GFR AFRICAN AMERICAN: >60
GFR NON-AFRICAN AMERICAN: >60
GLUCOSE BLD-MCNC: 101 MG/DL (ref 70–99)
POTASSIUM SERPL-SCNC: 4 MMOL/L (ref 3.5–5.1)
SODIUM BLD-SCNC: 142 MMOL/L (ref 136–145)

## 2019-01-31 PROCEDURE — G8484 FLU IMMUNIZE NO ADMIN: HCPCS | Performed by: INTERNAL MEDICINE

## 2019-01-31 PROCEDURE — G8417 CALC BMI ABV UP PARAM F/U: HCPCS | Performed by: INTERNAL MEDICINE

## 2019-01-31 PROCEDURE — G8427 DOCREV CUR MEDS BY ELIG CLIN: HCPCS | Performed by: INTERNAL MEDICINE

## 2019-01-31 PROCEDURE — 99214 OFFICE O/P EST MOD 30 MIN: CPT | Performed by: INTERNAL MEDICINE

## 2019-01-31 PROCEDURE — 4004F PT TOBACCO SCREEN RCVD TLK: CPT | Performed by: INTERNAL MEDICINE

## 2019-01-31 ASSESSMENT — ASTHMA QUESTIONNAIRES
QUESTION_3 LAST FOUR WEEKS HOW OFTEN DID YOUR ASTHMA SYMPTOMS (WHEEZING, COUGHING, SHORTNESS OF BREATH, CHEST TIGHTNESS OR PAIN) WAKE YOU UP AT NIGHT OR EARLIER THAN USUAL IN THE MORNING: 4
ACT_TOTALSCORE: 17
QUESTION_4 LAST FOUR WEEKS HOW OFTEN HAVE YOU USED YOUR RESCUE INHALER OR NEBULIZER MEDICATION (SUCH AS ALBUTEROL): 1
QUESTION_2 LAST FOUR WEEKS HOW OFTEN HAVE YOU HAD SHORTNESS OF BREATH: 4
QUESTION_5 LAST FOUR WEEKS HOW WOULD YOU RATE YOUR ASTHMA CONTROL: 4
QUESTION_1 LAST FOUR WEEKS HOW MUCH OF THE TIME DID YOUR ASTHMA KEEP YOU FROM GETTING AS MUCH DONE AT WORK, SCHOOL OR AT HOME: 4

## 2019-02-21 ENCOUNTER — HOSPITAL ENCOUNTER (OUTPATIENT)
Dept: NON INVASIVE DIAGNOSTICS | Age: 39
Discharge: HOME | End: 2019-02-21
Payer: MEDICARE

## 2019-02-21 DIAGNOSIS — I27.20 PULMONARY HYPERTENSION (HCC): ICD-10-CM

## 2019-02-21 LAB
LV EF: 58 %
LVEF MODALITY: NORMAL

## 2019-02-21 PROCEDURE — 93325 DOPPLER ECHO COLOR FLOW MAPG: CPT

## 2019-02-21 PROCEDURE — 93308 TTE F-UP OR LMTD: CPT

## 2019-02-21 PROCEDURE — 93320 DOPPLER ECHO COMPLETE: CPT

## 2019-04-08 LAB
AVERAGE GLUCOSE: NORMAL
HBA1C MFR BLD: 6.4 %

## 2019-04-16 ENCOUNTER — OFFICE VISIT (OUTPATIENT)
Dept: PULMONOLOGY | Age: 39
End: 2019-04-16
Payer: MEDICARE

## 2019-04-16 VITALS
HEIGHT: 72 IN | OXYGEN SATURATION: 94 % | HEART RATE: 78 BPM | RESPIRATION RATE: 16 BRPM | SYSTOLIC BLOOD PRESSURE: 111 MMHG | TEMPERATURE: 97.9 F | BODY MASS INDEX: 31.59 KG/M2 | DIASTOLIC BLOOD PRESSURE: 64 MMHG | WEIGHT: 233.2 LBS

## 2019-04-16 DIAGNOSIS — Z72.0 TOBACCO USE: ICD-10-CM

## 2019-04-16 DIAGNOSIS — G47.33 OSA (OBSTRUCTIVE SLEEP APNEA): ICD-10-CM

## 2019-04-16 DIAGNOSIS — J45.20 MILD INTERMITTENT ASTHMA WITHOUT COMPLICATION: Primary | ICD-10-CM

## 2019-04-16 DIAGNOSIS — I27.20 PULMONARY HYPERTENSION (HCC): ICD-10-CM

## 2019-04-16 PROCEDURE — 4004F PT TOBACCO SCREEN RCVD TLK: CPT | Performed by: INTERNAL MEDICINE

## 2019-04-16 PROCEDURE — G8417 CALC BMI ABV UP PARAM F/U: HCPCS | Performed by: INTERNAL MEDICINE

## 2019-04-16 PROCEDURE — 99214 OFFICE O/P EST MOD 30 MIN: CPT | Performed by: INTERNAL MEDICINE

## 2019-04-16 PROCEDURE — G8427 DOCREV CUR MEDS BY ELIG CLIN: HCPCS | Performed by: INTERNAL MEDICINE

## 2019-04-16 ASSESSMENT — ASTHMA QUESTIONNAIRES
QUESTION_1 LAST FOUR WEEKS HOW MUCH OF THE TIME DID YOUR ASTHMA KEEP YOU FROM GETTING AS MUCH DONE AT WORK, SCHOOL OR AT HOME: 4
QUESTION_3 LAST FOUR WEEKS HOW OFTEN DID YOUR ASTHMA SYMPTOMS (WHEEZING, COUGHING, SHORTNESS OF BREATH, CHEST TIGHTNESS OR PAIN) WAKE YOU UP AT NIGHT OR EARLIER THAN USUAL IN THE MORNING: 4
QUESTION_2 LAST FOUR WEEKS HOW OFTEN HAVE YOU HAD SHORTNESS OF BREATH: 4
QUESTION_5 LAST FOUR WEEKS HOW WOULD YOU RATE YOUR ASTHMA CONTROL: 5
ACT_TOTALSCORE: 21
QUESTION_4 LAST FOUR WEEKS HOW OFTEN HAVE YOU USED YOUR RESCUE INHALER OR NEBULIZER MEDICATION (SUCH AS ALBUTEROL): 4

## 2019-04-16 NOTE — PROGRESS NOTES
Chief complaint  This is a 44y.o. year old female  who presents with a chief complaint of   Chief Complaint   Patient presents with    Follow-up     Asthma       HPI  27-year-old woman with asthma, pulmonary hypertension, tobacco use and obstructive sleep apnea (on CPAP) presents for follow-up. 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 She denies being short of breath with activities. She denies cough or sputum production. Per her caregiver she is smoking 2-3 packs of cigarettes a day. She is not interested in quitting. She is compliant with Symbicort twice daily. She was Atrovent 4 times a day scheduled. She takes Lasix 20 mg daily. Past medical history:   She was admitted to [facility] from 9/23-9/30/15 for asthma exacerbation. She was treated with steroids, nebulizers, antibiotics and pulmonary toilet. Chest imaging showed atelectasis involving most lobes. There was no evidence of pulmonary embolus. She did have a significant oxygen requirement- needing up to 10L NC. This was eventually weaned to 2L. She was discharged home with this. Supplemental oxygen was discontinued in October 2017.     Past Medical History:   Diagnosis Date    Asthma     Atrial fibrillation (HCC)     Conduct disorder          Diabetes [facility]     Diabetes mellitus, type 2 ([facility]     Hypothyroidism     Pneumonia     Scoliosis     Scoliosis 1995    Sleep apnea     CPAP       Past Surgical History:   Procedure Laterality Date    APPENDECTOMY      KNEE ARTHROSCOPY Right 07/01/2016    LUNG SURGERY         Current Outpatient Medications   Medication Sig Dispense Refill    nystatin-triamcinolone (MYCOLOG) 196049-6.1 UNIT/GM-% ointment Apply topically 2 times daily to breast line and abdomen and then apply powder over the ointment 1 Tube 2    furosemide (LASIX) 20 MG tablet Take 1 tablet by mouth daily 30 tablet 0    aspirin 81 MG chewable tablet Take 81 mg by mouth daily      loratadine (CLARITIN) 10 MG tablet Take 10 mg by mouth daily      CYCLAFEM 1/35 1-35 MG-MCG per tablet 1 tablet daily      LORazepam (ATIVAN) 0.5 MG tablet 1 tablet every morning (before breakfast)      metoprolol (LOPRESSOR) 25 MG tablet TAKE ONE (1) TABLET BY MOUTH TWICE A DAY FOR TACHYCARDIA 60 tablet 6    ipratropium (ATROVENT HFA) 17 MCG/ACT inhaler Inhale 2 puffs into the lungs 4 times daily.  therapeutic multivitamin-minerals (THERAGRAN-M) tablet Take 1 tablet by mouth daily.  metFORMIN (GLUCOPHAGE) 500 MG tablet Take 500 mg by mouth 2 times daily (with meals).  docusate sodium (COLACE) 100 MG capsule Take 100 mg by mouth daily.  cloZAPine (CLOZARIL) 25 MG tablet Take 25 mg by mouth daily.  topiramate (TOPAMAX) 50 MG tablet Take 50 mg by mouth nightly.  benztropine (COGENTIN) 0.5 MG tablet Take 0.5 mg by mouth daily.  levothyroxine (SYNTHROID) 75 MCG tablet Take 75 mcg by mouth Daily.  Calcium-Vitamin D-Vitamin K (VIACTIV PO) Take 1 tablet by mouth 3 times daily.  divalproex (DEPAKOTE) 500 MG ER tablet Take 1,000 mg by mouth nightly.  budesonide-formoterol (SYMBICORT) 80-4.5 MCG/ACT AERO Inhale 2 puffs into the lungs 2 times daily.  FLUoxetine (PROZAC) 20 MG capsule Take 20 mg by mouth daily.  ketoconazole (NIZORAL) 2 % shampoo Apply topically once daily until healed. 1 Bottle 2    nystatin (MYCOSTATIN) 818064 UNIT/GM powder Apply topically 4 times daily. 1 Bottle 0    promethazine (PHENERGAN) 25 MG tablet Take 1 tablet by mouth every 6 hours as needed for Nausea 5 tablet 0     No current facility-administered medications for this visit.         Allergies   Allergen Reactions    Diphenoxylate-Atropine     Lamictal [Lamotrigine]     Pcn [Penicillins]        Family History   Problem Relation Age of Onset    Heart Failure Mother     Depression Father     Emphysema Paternal Grandfather     Liver Disease Maternal Aunt     Diabetes Paternal Grandmother Social History     Socioeconomic History    Marital status: Single     Spouse name: Not on file    Number of children: Not on file    Years of education: Not on file    Highest education level: Not on file   Occupational History    Not on file   Social Needs    Financial resource strain: Not on file    Food insecurity:     Worry: Not on file     Inability: Not on file    Transportation needs:     Medical: Not on file     Non-medical: Not on file   Tobacco Use    Smoking status: Current Every Day Smoker     Packs/day: 0.50     Years: 15.00     Pack years: 7.50     Types: Cigarettes     Start date: 1/1/1995    Smokeless tobacco: Never Used    Tobacco comment: 3 packs a week   Substance and Sexual Activity    Alcohol use: No     Alcohol/week: 0.0 oz    Drug use: No    Sexual activity: Not Currently   Lifestyle    Physical activity:     Days per week: Not on file     Minutes per session: Not on file    Stress: Not on file   Relationships    Social connections:     Talks on phone: Not on file     Gets together: Not on file     Attends Jain service: Not on file     Active member of club or organization: Not on file     Attends meetings of clubs or organizations: Not on file     Relationship status: Not on file    Intimate partner violence:     Fear of current or ex partner: Not on file     Emotionally abused: Not on file     Physically abused: Not on file     Forced sexual activity: Not on file   Other Topics Concern    Not on file   Social History Narrative    Not on file       Immunization History   Administered Date(s) Administered    Hepatitis B, unspecified formulation 09/22/2011    Influenza Vaccine, unspecified formulation 11/03/2009, 09/22/2011    Influenza Virus Vaccine 10/29/2013, 10/07/2014, 09/24/2015    Influenza, [place], 3 Years and older, IM (Fluzone 3 yrs and older or Afluria 5 yrs and older) 10/26/2016, 10/03/2017    Pneumococcal Polysaccharide ([id number]) 10/27/2015 and reports of chest imaging were reviewed by me. My interpretation is:    Chest CT (11/19/15): small areas of atelectasis in the RML, lingula and LLL; scoliosis causing compression of the left lung; PA-29mm         ECHO (2/21/19)   Summary   Normal LV size & wall thickness; EF    55-60%. No wall motion abnormalities   The left atrium is normal in size.   Normal right ventricular size and function.   Mild tricuspid regurgitation.        RHC (1/8/16): FINDINGS:    1. Relatively normal pulmonary capillary wedge pressure at 15 mmHg. The    right atrial pressure was slightly elevated at 10 mmHg.    2. Borderline pulmonary hypertension with a pressure of 43/17 and a mean    pulmonary arterial pressure of 29 mmHg. It was difficult for the patient to    cooperate with breathing instructions and there was somewhat of a fluctuation    in the mean pressures. I do think that the mean pressure of 29 mmHg is    accurate.    3. Normal oxygen saturations in the superior vena cava at 75% and right    atrium at 68%. The pulmonary arterial oxygen saturation was 73%.    4. Normal cardiac output by thermodilution at 5.79 liters per minute with a    cardiac index of 2.46 liters per kilogram per minute. By Levora Presser the    cardiac output is 7.52 liters per minute with a cardiac index of 3.2 liters    per kilogram per minute. Pulmonary vascular resistance is 193. Lab Results   Component Value Date    WBC 7.7 09/15/2016    HGB 15.2 09/15/2016    HCT 46.1 09/15/2016    MCV 86.4 09/15/2016     09/15/2016       No results found for: BNP    Lab Results   Component Value Date    CREATININE 0.7 01/31/2019    BUN 9 01/31/2019     01/31/2019    K 4.0 01/31/2019     01/31/2019    CO2 27 01/31/2019         Assessment/Plan:44y.o. year old female presents for follow up. Asthma- Mild intermittent. Continue Symbicort twice daily and Atrovent inhaler four times daily scheduled.     Pulmonary hypertension- Serial echocardiograms have shown a decrease in RVSP with Lasix. Continue Lasix 20 mg daily. Will obtain yearly echocardiograms. Tobacco use- We have discussed the importance of smoking cessation. She is not interested in quitting. Obstructive sleep apnea- On CPAP. Follows with Dr. Cayla Arias. She will return for follow-up in 3 months.       Young Donovan MD  [place] Pulmonology, Critical Care and Sleep

## 2019-07-31 ENCOUNTER — OFFICE VISIT (OUTPATIENT)
Dept: PULMONOLOGY | Age: 39
End: 2019-07-31
Payer: MEDICARE

## 2019-07-31 VITALS
HEART RATE: 85 BPM | RESPIRATION RATE: 16 BRPM | DIASTOLIC BLOOD PRESSURE: 62 MMHG | WEIGHT: 232.2 LBS | TEMPERATURE: 98.2 F | BODY MASS INDEX: 31.45 KG/M2 | SYSTOLIC BLOOD PRESSURE: 100 MMHG | HEIGHT: 72 IN | OXYGEN SATURATION: 95 %

## 2019-07-31 DIAGNOSIS — J45.20 MILD INTERMITTENT ASTHMA WITHOUT COMPLICATION: Primary | ICD-10-CM

## 2019-07-31 DIAGNOSIS — Z72.0 TOBACCO USE: ICD-10-CM

## 2019-07-31 DIAGNOSIS — I27.20 PULMONARY HYPERTENSION (HCC): ICD-10-CM

## 2019-07-31 DIAGNOSIS — G47.33 OSA (OBSTRUCTIVE SLEEP APNEA): ICD-10-CM

## 2019-07-31 PROCEDURE — 4004F PT TOBACCO SCREEN RCVD TLK: CPT | Performed by: INTERNAL MEDICINE

## 2019-07-31 PROCEDURE — 99213 OFFICE O/P EST LOW 20 MIN: CPT | Performed by: INTERNAL MEDICINE

## 2019-07-31 PROCEDURE — G8417 CALC BMI ABV UP PARAM F/U: HCPCS | Performed by: INTERNAL MEDICINE

## 2019-07-31 PROCEDURE — G8427 DOCREV CUR MEDS BY ELIG CLIN: HCPCS | Performed by: INTERNAL MEDICINE

## 2019-07-31 ASSESSMENT — ASTHMA QUESTIONNAIRES
ACT_TOTALSCORE: 20
QUESTION_4 LAST FOUR WEEKS HOW OFTEN HAVE YOU USED YOUR RESCUE INHALER OR NEBULIZER MEDICATION (SUCH AS ALBUTEROL): 3
QUESTION_2 LAST FOUR WEEKS HOW OFTEN HAVE YOU HAD SHORTNESS OF BREATH: 4
QUESTION_5 LAST FOUR WEEKS HOW WOULD YOU RATE YOUR ASTHMA CONTROL: 4
QUESTION_3 LAST FOUR WEEKS HOW OFTEN DID YOUR ASTHMA SYMPTOMS (WHEEZING, COUGHING, SHORTNESS OF BREATH, CHEST TIGHTNESS OR PAIN) WAKE YOU UP AT NIGHT OR EARLIER THAN USUAL IN THE MORNING: 5
QUESTION_1 LAST FOUR WEEKS HOW MUCH OF THE TIME DID YOUR ASTHMA KEEP YOU FROM GETTING AS MUCH DONE AT WORK, SCHOOL OR AT HOME: 4

## 2019-07-31 NOTE — PROGRESS NOTES
Chief complaint  This is a 44y.o. year old female  who presents with a chief complaint of   Chief Complaint   Patient presents with    Follow-up     Asthma       HPI  70-year-old woman with asthma, pulmonary hypertension, tobacco use and obstructive sleep apnea (on CPAP) presents for follow-up. 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 She says she enjoys her job. She denies shortness of breath, cough or sputum production. She is using Symbicort twice daily and Atrovent 4 times daily scheduled. She also takes Lasix 20 mg daily. She admits that she is smoking 1 cigarette about every 2 hours. She says it helps to calm her. Past medical history:   She was admitted to [facility] from 9/23-9/30/15 for asthma exacerbation. She was treated with steroids, nebulizers, antibiotics and pulmonary toilet. Chest imaging showed atelectasis involving most lobes. There was no evidence of pulmonary embolus. She did have a significant oxygen requirement- needing up to 10L NC. This was eventually weaned to 2L. She was discharged home with this. Supplemental oxygen was discontinued in October 2017. Past Medical History:   Diagnosis Date    Asthma     [facility] ([facility]     Conduct disorder          Diabetes mellitus ([facility]     Diabetes mellitus, type 2 ([facility]     Hypothyroidism     Pneumonia     Scoliosis     Scoliosis 1995    Sleep apnea     CPAP       Past Surgical History:   Procedure Laterality Date    APPENDECTOMY      KNEE ARTHROSCOPY Right 07/01/2016    LUNG SURGERY         Current Outpatient Medications   Medication Sig Dispense Refill    ketoconazole (NIZORAL) 2 % shampoo Apply topically once daily until healed.  1 Bottle 2    nystatin-triamcinolone (MYCOLOG) 247365-5.1 UNIT/GM-% ointment Apply topically 2 times daily to breast line and abdomen and then apply powder over the ointment 1 Tube 2    furosemide (LASIX) 20 MG tablet Take 1 tablet by mouth daily 30

## 2020-01-16 ENCOUNTER — OFFICE VISIT (OUTPATIENT)
Dept: PULMONOLOGY | Age: 40
End: 2020-01-16
Payer: MEDICARE

## 2020-01-16 VITALS
HEIGHT: 72 IN | BODY MASS INDEX: 31.45 KG/M2 | DIASTOLIC BLOOD PRESSURE: 72 MMHG | WEIGHT: 232.2 LBS | SYSTOLIC BLOOD PRESSURE: 102 MMHG | OXYGEN SATURATION: 92 % | HEART RATE: 87 BPM

## 2020-01-16 PROCEDURE — 99214 OFFICE O/P EST MOD 30 MIN: CPT | Performed by: INTERNAL MEDICINE

## 2020-01-16 PROCEDURE — G8484 FLU IMMUNIZE NO ADMIN: HCPCS | Performed by: INTERNAL MEDICINE

## 2020-01-16 PROCEDURE — 4004F PT TOBACCO SCREEN RCVD TLK: CPT | Performed by: INTERNAL MEDICINE

## 2020-01-16 PROCEDURE — G8427 DOCREV CUR MEDS BY ELIG CLIN: HCPCS | Performed by: INTERNAL MEDICINE

## 2020-01-16 PROCEDURE — G8417 CALC BMI ABV UP PARAM F/U: HCPCS | Performed by: INTERNAL MEDICINE

## 2020-01-16 ASSESSMENT — ASTHMA QUESTIONNAIRES
ACT_TOTALSCORE: 23
QUESTION_4 LAST FOUR WEEKS HOW OFTEN HAVE YOU USED YOUR RESCUE INHALER OR NEBULIZER MEDICATION (SUCH AS ALBUTEROL): 5
QUESTION_2 LAST FOUR WEEKS HOW OFTEN HAVE YOU HAD SHORTNESS OF BREATH: 4
QUESTION_1 LAST FOUR WEEKS HOW MUCH OF THE TIME DID YOUR ASTHMA KEEP YOU FROM GETTING AS MUCH DONE AT WORK, SCHOOL OR AT HOME: 5
QUESTION_3 LAST FOUR WEEKS HOW OFTEN DID YOUR ASTHMA SYMPTOMS (WHEEZING, COUGHING, SHORTNESS OF BREATH, CHEST TIGHTNESS OR PAIN) WAKE YOU UP AT NIGHT OR EARLIER THAN USUAL IN THE MORNING: 5
QUESTION_5 LAST FOUR WEEKS HOW WOULD YOU RATE YOUR ASTHMA CONTROL: 4

## 2020-01-28 ENCOUNTER — OFFICE VISIT (OUTPATIENT)
Dept: PULMONOLOGY | Age: 40
End: 2020-01-28
Payer: MEDICARE

## 2020-01-28 VITALS
SYSTOLIC BLOOD PRESSURE: 118 MMHG | DIASTOLIC BLOOD PRESSURE: 78 MMHG | TEMPERATURE: 97.8 F | HEIGHT: 72 IN | OXYGEN SATURATION: 93 % | BODY MASS INDEX: 31.97 KG/M2 | WEIGHT: 236 LBS | HEART RATE: 76 BPM | RESPIRATION RATE: 15 BRPM

## 2020-01-28 PROCEDURE — 99213 OFFICE O/P EST LOW 20 MIN: CPT | Performed by: INTERNAL MEDICINE

## 2020-01-28 PROCEDURE — G8417 CALC BMI ABV UP PARAM F/U: HCPCS | Performed by: INTERNAL MEDICINE

## 2020-01-28 PROCEDURE — 4004F PT TOBACCO SCREEN RCVD TLK: CPT | Performed by: INTERNAL MEDICINE

## 2020-01-28 PROCEDURE — G8427 DOCREV CUR MEDS BY ELIG CLIN: HCPCS | Performed by: INTERNAL MEDICINE

## 2020-01-28 PROCEDURE — G8484 FLU IMMUNIZE NO ADMIN: HCPCS | Performed by: INTERNAL MEDICINE

## 2020-01-28 ASSESSMENT — SLEEP AND FATIGUE QUESTIONNAIRES
ESS TOTAL SCORE: 0
HOW LIKELY ARE YOU TO NOD OFF OR FALL ASLEEP WHILE SITTING INACTIVE IN A PUBLIC PLACE: 0
HOW LIKELY ARE YOU TO NOD OFF OR FALL ASLEEP IN A CAR, WHILE STOPPED FOR A FEW MINUTES IN TRAFFIC: 0
NECK CIRCUMFERENCE (INCHES): 0
HOW LIKELY ARE YOU TO NOD OFF OR FALL ASLEEP WHEN YOU ARE A PASSENGER IN A CAR FOR AN HOUR WITHOUT A BREAK: 0
HOW LIKELY ARE YOU TO NOD OFF OR FALL ASLEEP WHILE SITTING AND TALKING TO SOMEONE: 0
HOW LIKELY ARE YOU TO NOD OFF OR FALL ASLEEP WHILE WATCHING TV: 0
HOW LIKELY ARE YOU TO NOD OFF OR FALL ASLEEP WHILE LYING DOWN TO REST IN THE AFTERNOON WHEN CIRCUMSTANCES PERMIT: 0
HOW LIKELY ARE YOU TO NOD OFF OR FALL ASLEEP WHILE SITTING QUIETLY AFTER LUNCH WITHOUT ALCOHOL: 0
HOW LIKELY ARE YOU TO NOD OFF OR FALL ASLEEP WHILE SITTING AND READING: 0

## 2020-01-28 NOTE — PROGRESS NOTES
Chief complaint  This is a 36y.o. year old female  who comes to see me with a chief complaint of   Chief Complaint   Patient presents with    1 Year Follow Up     FREIDA/ CPAP     . HPI  Here with cc of Moderate FREIDA with AHI of 26    Machine:  Patient is using a CPAP machine. Average usage is 9 hrs 46 min 38 sec. She is meeting 4 or more hours per night 100% of the time. Average AHI is 2.2.   . Sleeping well. No new issues.         Sleep Medicine 1/28/2020 1/3/2019 6/19/2018 12/19/2017 5/25/2017 11/23/2016 5/11/2016   Sitting and reading 0 1 1 0 0 0 1   Watching TV 0 1 1 0 0 0 0   Sitting, inactive in a public place (e.g. a theatre or a meeting) 0 1 1 0 0 0 0   As a passenger in a car for an hour without a break 0 1 1 1 0 0 1   Lying down to rest in the afternoon when circumstances permit 0 1 1 0 0 0 1   Sitting and talking to someone 0 1 1 0 0 0 0   Sitting quietly after a lunch without alcohol 0 1 1 0 0 0 0   In a car, while stopped for a few minutes in traffic 0 1 1 0 0 0 0   Total score 0 8 8 1 0 0 3   Neck circumference 0 - - - - 17 -       Past Medical History:   Diagnosis Date    Asthma     [facility]     Conduct disorder     COPD (chronic obstructive pulmonary disease) ([facility]     Diabetes mellitus ([facility]     [facility], [facility]     Hypothyroidism     Pneumonia     Scoliosis     Scoliosis 1995    Sleep apnea     CPAP       Past Surgical History:   Procedure Laterality Date    APPENDECTOMY      KNEE ARTHROSCOPY Right 07/01/2016    LUNG SURGERY         Social History     Socioeconomic History    Marital status: Single     Spouse name: None    Number of children: None    Years of education: None    Highest education level: None   Occupational History    None   Social Needs    Financial resource strain: None    Food insecurity:     Worry: None     Inability: None    Transportation needs:     Medical: None     Non-medical: None   Tobacco Use    Smoking control of chronic medical problems        Follow up in 12 months

## 2020-03-23 ENCOUNTER — TELEPHONE (OUTPATIENT)
Dept: PULMONOLOGY | Age: 40
End: 2020-03-23

## 2020-03-23 NOTE — TELEPHONE ENCOUNTER
No, she should not be working as a  in hospitals until the coronavirus pandemic is under control. I have written the letter.

## 2020-04-28 ENCOUNTER — VIRTUAL VISIT (OUTPATIENT)
Dept: PULMONOLOGY | Age: 40
End: 2020-04-28
Payer: MEDICARE

## 2020-04-28 ENCOUNTER — TELEPHONE (OUTPATIENT)
Dept: PULMONOLOGY | Age: 40
End: 2020-04-28

## 2020-04-28 PROCEDURE — G8428 CUR MEDS NOT DOCUMENT: HCPCS | Performed by: INTERNAL MEDICINE

## 2020-04-28 PROCEDURE — 99214 OFFICE O/P EST MOD 30 MIN: CPT | Performed by: INTERNAL MEDICINE

## 2020-04-28 NOTE — TELEPHONE ENCOUNTER
Spoke to [facility] coordinator for [facility] and scheduled her 6/25/20

## 2020-05-26 ENCOUNTER — TELEPHONE (OUTPATIENT)
Dept: SLEEP MEDICINE | Age: 40
End: 2020-05-26

## 2020-06-04 ENCOUNTER — HOSPITAL ENCOUNTER (OUTPATIENT)
Dept: NON INVASIVE DIAGNOSTICS | Age: 40
Discharge: HOME | End: 2020-06-04
Payer: MEDICARE

## 2020-06-04 LAB
LV EF: 58 %
LVEF MODALITY: NORMAL

## 2020-06-04 PROCEDURE — 93306 TTE W/DOPPLER COMPLETE: CPT

## 2020-07-28 ENCOUNTER — OFFICE VISIT (OUTPATIENT)
Dept: PULMONOLOGY | Age: 40
End: 2020-07-28
Payer: MEDICARE

## 2020-07-28 VITALS
HEIGHT: 72 IN | DIASTOLIC BLOOD PRESSURE: 70 MMHG | TEMPERATURE: 98.5 F | BODY MASS INDEX: 32.1 KG/M2 | HEART RATE: 79 BPM | WEIGHT: 237 LBS | OXYGEN SATURATION: 91 % | RESPIRATION RATE: 16 BRPM | SYSTOLIC BLOOD PRESSURE: 118 MMHG

## 2020-07-28 PROCEDURE — G8417 CALC BMI ABV UP PARAM F/U: HCPCS | Performed by: INTERNAL MEDICINE

## 2020-07-28 PROCEDURE — 99213 OFFICE O/P EST LOW 20 MIN: CPT | Performed by: INTERNAL MEDICINE

## 2020-07-28 PROCEDURE — G8428 CUR MEDS NOT DOCUMENT: HCPCS | Performed by: INTERNAL MEDICINE

## 2020-07-28 PROCEDURE — 4004F PT TOBACCO SCREEN RCVD TLK: CPT | Performed by: INTERNAL MEDICINE

## 2020-07-28 ASSESSMENT — ASTHMA QUESTIONNAIRES
QUESTION_3 LAST FOUR WEEKS HOW OFTEN DID YOUR ASTHMA SYMPTOMS (WHEEZING, COUGHING, SHORTNESS OF BREATH, CHEST TIGHTNESS OR PAIN) WAKE YOU UP AT NIGHT OR EARLIER THAN USUAL IN THE MORNING: 4
QUESTION_4 LAST FOUR WEEKS HOW OFTEN HAVE YOU USED YOUR RESCUE INHALER OR NEBULIZER MEDICATION (SUCH AS ALBUTEROL): 3
ACT_TOTALSCORE: 19
QUESTION_5 LAST FOUR WEEKS HOW WOULD YOU RATE YOUR ASTHMA CONTROL: 4
QUESTION_1 LAST FOUR WEEKS HOW MUCH OF THE TIME DID YOUR ASTHMA KEEP YOU FROM GETTING AS MUCH DONE AT WORK, SCHOOL OR AT HOME: 4
QUESTION_2 LAST FOUR WEEKS HOW OFTEN HAVE YOU HAD SHORTNESS OF BREATH: 4

## 2021-01-12 ENCOUNTER — OFFICE VISIT (OUTPATIENT)
Dept: PULMONOLOGY | Age: 41
End: 2021-01-12
Payer: MEDICARE

## 2021-01-12 VITALS
HEART RATE: 84 BPM | WEIGHT: 234.6 LBS | BODY MASS INDEX: 31.77 KG/M2 | OXYGEN SATURATION: 93 % | SYSTOLIC BLOOD PRESSURE: 112 MMHG | DIASTOLIC BLOOD PRESSURE: 68 MMHG | HEIGHT: 72 IN | TEMPERATURE: 97.1 F | RESPIRATION RATE: 12 BRPM

## 2021-01-12 DIAGNOSIS — G47.33 OSA (OBSTRUCTIVE SLEEP APNEA): ICD-10-CM

## 2021-01-12 DIAGNOSIS — Z72.0 TOBACCO USE: ICD-10-CM

## 2021-01-12 DIAGNOSIS — I27.20 PULMONARY HYPERTENSION (HCC): ICD-10-CM

## 2021-01-12 DIAGNOSIS — J45.20 MILD INTERMITTENT ASTHMA WITHOUT COMPLICATION: Primary | ICD-10-CM

## 2021-01-12 PROCEDURE — 99213 OFFICE O/P EST LOW 20 MIN: CPT | Performed by: INTERNAL MEDICINE

## 2021-01-12 PROCEDURE — G8417 CALC BMI ABV UP PARAM F/U: HCPCS | Performed by: INTERNAL MEDICINE

## 2021-01-12 PROCEDURE — G8484 FLU IMMUNIZE NO ADMIN: HCPCS | Performed by: INTERNAL MEDICINE

## 2021-01-12 PROCEDURE — 4004F PT TOBACCO SCREEN RCVD TLK: CPT | Performed by: INTERNAL MEDICINE

## 2021-01-12 PROCEDURE — G8427 DOCREV CUR MEDS BY ELIG CLIN: HCPCS | Performed by: INTERNAL MEDICINE

## 2021-01-12 ASSESSMENT — ASTHMA QUESTIONNAIRES
QUESTION_2 LAST FOUR WEEKS HOW OFTEN HAVE YOU HAD SHORTNESS OF BREATH: 5
QUESTION_3 LAST FOUR WEEKS HOW OFTEN DID YOUR ASTHMA SYMPTOMS (WHEEZING, COUGHING, SHORTNESS OF BREATH, CHEST TIGHTNESS OR PAIN) WAKE YOU UP AT NIGHT OR EARLIER THAN USUAL IN THE MORNING: 5
QUESTION_4 LAST FOUR WEEKS HOW OFTEN HAVE YOU USED YOUR RESCUE INHALER OR NEBULIZER MEDICATION (SUCH AS ALBUTEROL): 5
QUESTION_5 LAST FOUR WEEKS HOW WOULD YOU RATE YOUR ASTHMA CONTROL: 5
ACT_TOTALSCORE: 25

## 2021-01-12 NOTE — PROGRESS NOTES
loratadine (CLARITIN) 10 MG tablet Take 10 mg by mouth daily      CYCLAFEM 1/35 1-35 MG-MCG per tablet 1 tablet daily      LORazepam (ATIVAN) 0.5 MG tablet 1 tablet every morning (before breakfast)      metoprolol (LOPRESSOR) 25 MG tablet TAKE ONE (1) TABLET BY MOUTH TWICE A DAY FOR TACHYCARDIA 60 tablet 6    ipratropium (ATROVENT HFA) 17 MCG/ACT inhaler Inhale 2 puffs into the lungs 4 times daily.  therapeutic multivitamin-minerals (THERAGRAN-M) tablet Take 1 tablet by mouth daily.  metFORMIN (GLUCOPHAGE) 500 MG tablet Take 500 mg by mouth 2 times daily (with meals).  docusate sodium (COLACE) 100 MG capsule Take 100 mg by mouth daily.  cloZAPine (CLOZARIL) 25 MG tablet Take 25 mg by mouth daily.  topiramate (TOPAMAX) 50 MG tablet Take 50 mg by mouth nightly.  benztropine (COGENTIN) 0.5 MG tablet Take 0.5 mg by mouth daily.  levothyroxine (SYNTHROID) 75 MCG tablet Take 75 mcg by mouth Daily.  Calcium-Vitamin D-Vitamin K (VIACTIV PO) Take 1 tablet by mouth 3 times daily.  divalproex (DEPAKOTE) 500 MG ER tablet Take 1,000 mg by mouth nightly.  budesonide-formoterol (SYMBICORT) 80-4.5 MCG/ACT AERO Inhale 2 puffs into the lungs 2 times daily.  FLUoxetine (PROZAC) 20 MG capsule Take 20 mg by mouth daily. No current facility-administered medications for this visit.         Allergies   Allergen Reactions    Diphenoxylate-Atropine     Lamictal [Lamotrigine]     Pcn [Penicillins]        Family History   Problem Relation Age of Onset    Heart Failure Mother     Depression Father     Emphysema Paternal Grandfather     Liver Disease Maternal Aunt     Diabetes Paternal Grandmother        Social History     Socioeconomic History    Marital status: Single     Spouse name: Not on file    Number of children: Not on file    Years of education: Not on file    Highest education level: Not on file   Occupational History    Not on file   Social Needs    Financial resource strain: Not on file    Food insecurity     Worry: Not on file     Inability: Not on file    Transportation needs     Medical: Not on file     Non-medical: Not on file   Tobacco Use    Smoking status: Current Every Day Smoker     Packs/day: 0.50     Years: 15.00     Pack years: 7.50     Types: Cigarettes     Start date: 1/1/1995    Smokeless tobacco: Never Used    Tobacco comment: 1/12/21 2 packs a week   Substance and Sexual Activity    Alcohol use: No     Alcohol/week: 0.0 standard drinks    Drug use: No    Sexual activity: Not Currently   Lifestyle    Physical activity     Days per week: Not on file     Minutes per session: Not on file    Stress: Not on file   Relationships    Social connections     Talks on phone: Not on file     Gets together: Not on file     Attends Yazdanism service: Not on file     Active member of club or organization: Not on file     Attends meetings of clubs or organizations: Not on file     Relationship status: Not on file    Intimate partner violence     Fear of current or ex partner: Not on file     Emotionally abused: Not on file     Physically abused: Not on file     Forced sexual activity: Not on file   Other Topics Concern    Not on file   Social History Narrative    Not on file       Immunization History   Administered Date(s) Administered    Hepatitis B 09/22/2011    Influenza Vaccine, unspecified formulation 11/03/2009, 09/22/2011    Influenza Virus Vaccine 10/29/2013, 10/07/2014, 09/24/2015    Influenza, Quadv, IM, (6 mo and older Fluzone, Flulaval, Fluarix and 3 yrs and older Afluria) 10/26/2016, 10/03/2017    Pneumococcal Polysaccharide (Elirpceuz01) 10/27/2015    Td, unspecified formulation 09/22/2011       ROS:  GENERAL:  No fevers, no chills, +2lb weight gain in 1 year  RESPIRATORY:  No shortness of breath, no cough      PHYSICAL EXAM:  Vitals:    01/12/21 0841   BP: 112/68   Site: Right Upper Arm   Position: Sitting   Cuff Size: Medium Adult   Pulse: 84   Resp: 12   Temp: 97.1 °F (36.2 °C)   TempSrc: Temporal   SpO2: 93%   Weight: 234 lb 9.6 oz (106.4 kg)   Height: 6' (1.829 m)   on RA    Gen: Well developed; well nourished  Eyes: No scleral icterus. No conjunctival injection. ENT:  Oropharynx clear. External appearance of ears and nose normal.  Neck: Trachea midline. No obvious mass. No visible thyroid enlargement    Respiratory: Clear to auscultation bilaterally, no accessory muscle use  Cardiovascular: Regular rate and rhythm, no appreciable murmurs. No edema. Gastrointestinal: Soft, non-tender. No hernia  Skin: Warm and dry. No rashes or ulcers on visible areas. Normal texture and turgor  Lymphatic: No cervical LAD. No supraclavicular LAD. Musculoskeletal: No cyanosis, clubbing or joint deformity. Psychiatric: Normal mood and affect; exhibits normal insight and judgement       Data reviewed:  Pulmonary Function Testing (8/31/16)  FVC 2.12 L at 47% predicted ---> 2.4L at 53% predicted  FEV1 1.52 L at 41% predicted ----> 1.77L at 46% predicted  FEV1/FVC ratio at 72% ---->74% predicted  TLC 3.83 L at 64% predicted  VC 2.28L at 52% predicted  ERV 0.43L at 30% predicted  RV/TLC at 40% predicted  DLCO 19.1 at 71% predicted  DLCO/VA 6.52L at 172 % predicted      MIP -30cm H2O  MEP 77cm H2O      Overall: Combined obstructive and restrictive ventilatory defects. The obstruction appears to be mild. There is significant reversal with bronchodilators. Restriction is moderate. Diffusing capacity is mildly reduced. It normalizes when averaged over lung volumes. MIP and MEP are reduced. Compared to the study in March 2016 there has been significant improvement in post-bronchodilator flow measurements and vital capacity has significantly improved.         ABG (3/3/16): 7.38/52/59 (on RA)       Images and reports of chest imaging were reviewed by me.  My interpretation is:    Chest CT (11/19/15): small areas of atelectasis in the RML, lingula Continue Symbicort twice daily and Atrovent inhaler 4 times daily. Pulmonary hypertension- Echocardiograms show serial decrease in RVSP. Continue Lasix 20 mg daily. Tobacco use- She is not ready to quit. Obstructive sleep apnea- She is compliant with CPAP and follows with Dr. Alli Barrera. She will return for follow-up in 6 months.       Bennie Singh MD  [facility] Pulmonology, Critical Care and Sleep

## 2021-04-01 ENCOUNTER — OFFICE VISIT (OUTPATIENT)
Dept: PULMONOLOGY | Age: 41
End: 2021-04-01
Payer: MEDICARE

## 2021-04-01 VITALS
RESPIRATION RATE: 16 BRPM | TEMPERATURE: 97.1 F | SYSTOLIC BLOOD PRESSURE: 120 MMHG | BODY MASS INDEX: 31.97 KG/M2 | HEART RATE: 86 BPM | DIASTOLIC BLOOD PRESSURE: 74 MMHG | HEIGHT: 72 IN | OXYGEN SATURATION: 94 % | WEIGHT: 236 LBS

## 2021-04-01 DIAGNOSIS — G47.33 OSA ON CPAP: Primary | ICD-10-CM

## 2021-04-01 DIAGNOSIS — Z99.89 OSA ON CPAP: Primary | ICD-10-CM

## 2021-04-01 PROCEDURE — 99212 OFFICE O/P EST SF 10 MIN: CPT | Performed by: INTERNAL MEDICINE

## 2021-04-01 PROCEDURE — G8417 CALC BMI ABV UP PARAM F/U: HCPCS | Performed by: INTERNAL MEDICINE

## 2021-04-01 PROCEDURE — G8427 DOCREV CUR MEDS BY ELIG CLIN: HCPCS | Performed by: INTERNAL MEDICINE

## 2021-04-01 PROCEDURE — 4004F PT TOBACCO SCREEN RCVD TLK: CPT | Performed by: INTERNAL MEDICINE

## 2021-04-01 ASSESSMENT — SLEEP AND FATIGUE QUESTIONNAIRES
HOW LIKELY ARE YOU TO NOD OFF OR FALL ASLEEP WHILE SITTING AND TALKING TO SOMEONE: 0
HOW LIKELY ARE YOU TO NOD OFF OR FALL ASLEEP WHILE WATCHING TV: 0
ESS TOTAL SCORE: 0
HOW LIKELY ARE YOU TO NOD OFF OR FALL ASLEEP WHILE SITTING INACTIVE IN A PUBLIC PLACE: 0

## 2021-04-01 NOTE — PROGRESS NOTES
Chief complaint  This is a 39y.o. year old female  who comes to see me with a chief complaint of   Chief Complaint   Patient presents with    Sleep Apnea    . HPI  Here with cc of Moderate FREIDA with AHI of 26    Did not bring in chip    Says she sleeps soundly almost every night.   Rarely wakes up     No other changes       Sleep Medicine 4/1/2021 1/28/2020 1/3/2019 6/19/2018 12/19/2017 5/25/2017 11/23/2016   Sitting and reading 0 0 1 1 0 0 0   Watching TV 0 0 1 1 0 0 0   Sitting, inactive in a public place (e.g. a theatre or a meeting) 0 0 1 1 0 0 0   As a passenger in a car for an hour without a break 0 0 1 1 1 0 0   Lying down to rest in the afternoon when circumstances permit 0 0 1 1 0 0 0   Sitting and talking to someone 0 0 1 1 0 0 0   Sitting quietly after a lunch without alcohol 0 0 1 1 0 0 0   In a car, while stopped for a few minutes in traffic 0 0 1 1 0 0 0   Total score 0 0 8 8 1 0 0   Neck circumference - 0 - - - - 17       Past Medical History:   Diagnosis Date    Asthma     At[facility] ([facility]     Conduct disorder     COPD (chronic obstructive pulmonary disease) ([facility]     Diabetes mellitus ([facility]     Diabetes mellitus, type 2 ([facility]     Hypothyroidism     Pneumonia     Scoliosis     Scoliosis 1995    Sleep apnea     CPAP       Past Surgical History:   Procedure Laterality Date    APPENDECTOMY      KNEE ARTHROSCOPY Right 07/01/2016    LUNG SURGERY         Social History     Socioeconomic History    Marital status: Single     Spouse name: None    Number of children: None    Years of education: None    Highest education level: None   Occupational History    None   Social Needs    Financial resource strain: None    Food insecurity     Worry: None     Inability: None    Transportation needs     Medical: None     Non-medical: None   Tobacco Use    Smoking status: Current Every Day Smoker     Packs/day: 0.50     Years: 15.00     Pack years: 7.50     Types: Cigarettes     Start date: 1/1/1995    Smokeless tobacco: Never Used   Substance and Sexual Activity    Alcohol use: No     Alcohol/week: 0.0 standard drinks    Drug use: No    Sexual activity: Not Currently   Lifestyle    Physical activity     Days per week: None     Minutes per session: None    Stress: None   Relationships    Social connections     Talks on phone: None     Gets together: None     Attends Yazidism service: None     Active member of club or organization: None     Attends meetings of clubs or organizations: None     Relationship status: None    Intimate partner violence     Fear of current or ex partner: None     Emotionally abused: None     Physically abused: None     Forced sexual activity: None   Other Topics Concern    None   Social History Narrative    None       PHYSICAL EXAM:  GENERAL:  Well nourished, alert, appears stated age, no distress  HEENT:  No scleral icterus, no conjunctival irritation, Mallampati IV, narrow pharynx, with poor dentition, exotropia   NECK:  No thyromegaly, no bruits  LYMPH:  No cervical or supraclavicular adenopathy  HEART:  Regular rate and rhythm, no murmurs. Accentuated S2  LUNGS:  Clear bilaterally   ABDOMEN:  No distention, no organomegaly  EXTREMITIES:  No edema, no digital clubbing  NEURO:  No localizing deficits, CN II-XII intact      Lab Results   Component Value Date    WBC 7.7 09/15/2016    HGB 15.2 09/15/2016    HCT 46.1 09/15/2016    MCV 86.4 09/15/2016     09/15/2016       No results found for: BNP    Lab Results   Component Value Date    CREATININE 0.7 01/31/2019    BUN 9 01/31/2019     01/31/2019    K 4.0 01/31/2019     01/31/2019    CO2 27 01/31/2019           Assessment/Plan:  1.  FREIDA on CPAP   Benefiting from therapy by a low [place] score, good energy levels, low fatigue, and control of chronic medical problems    Bring in chip at next visit with Dr. Willy Silverio so I can add download information into chart       Follow up in 12 months Download received on 4/6    Machine:  Patient is using a CPAP machine. Average usage is 10 hrs 9 min 38 sec. She is meeting 4 or more hours per night 100% of the time. Average AHI is 1.5.

## 2021-05-04 ENCOUNTER — HOSPITAL ENCOUNTER (EMERGENCY)
Age: 41
Discharge: HOME | End: 2021-05-04
Payer: MEDICARE

## 2021-05-04 ENCOUNTER — APPOINTMENT (OUTPATIENT)
Dept: GENERAL RADIOLOGY | Age: 41
End: 2021-05-04
Payer: MEDICARE

## 2021-05-04 VITALS
OXYGEN SATURATION: 94 % | HEART RATE: 78 BPM | RESPIRATION RATE: 16 BRPM | SYSTOLIC BLOOD PRESSURE: 112 MMHG | TEMPERATURE: 97.3 F | DIASTOLIC BLOOD PRESSURE: 72 MMHG

## 2021-05-04 DIAGNOSIS — M54.31 SCIATICA OF RIGHT SIDE: Primary | ICD-10-CM

## 2021-05-04 LAB — HCG(URINE) PREGNANCY TEST: NEGATIVE

## 2021-05-04 PROCEDURE — 96372 THER/PROPH/DIAG INJ SC/IM: CPT

## 2021-05-04 PROCEDURE — 6360000002 HC RX W HCPCS: Performed by: PHYSICIAN ASSISTANT

## 2021-05-04 PROCEDURE — 72100 X-RAY EXAM L-S SPINE 2/3 VWS: CPT

## 2021-05-04 PROCEDURE — 99283 EMERGENCY DEPT VISIT LOW MDM: CPT

## 2021-05-04 PROCEDURE — 84703 CHORIONIC GONADOTROPIN ASSAY: CPT

## 2021-05-04 RX ORDER — ORPHENADRINE CITRATE 100 MG/1
100 TABLET, EXTENDED RELEASE ORAL 2 TIMES DAILY
Qty: 10 TABLET | Refills: 0 | Status: SHIPPED | OUTPATIENT
Start: 2021-05-04 | End: 2021-05-09

## 2021-05-04 RX ORDER — KETOROLAC TROMETHAMINE 30 MG/ML
30 INJECTION, SOLUTION INTRAMUSCULAR; INTRAVENOUS ONCE
Status: COMPLETED | OUTPATIENT
Start: 2021-05-04 | End: 2021-05-04

## 2021-05-04 RX ORDER — ORPHENADRINE CITRATE 30 MG/ML
60 INJECTION INTRAMUSCULAR; INTRAVENOUS ONCE
Status: COMPLETED | OUTPATIENT
Start: 2021-05-04 | End: 2021-05-04

## 2021-05-04 RX ORDER — KETOROLAC TROMETHAMINE 10 MG/1
10 TABLET, FILM COATED ORAL 3 TIMES DAILY
Qty: 15 TABLET | Refills: 0 | Status: SHIPPED | OUTPATIENT
Start: 2021-05-04 | End: 2021-10-25 | Stop reason: ALTCHOICE

## 2021-05-04 RX ADMIN — KETOROLAC TROMETHAMINE 30 MG: 30 INJECTION, SOLUTION INTRAMUSCULAR at 16:00

## 2021-05-04 RX ADMIN — ORPHENADRINE CITRATE 60 MG: 30 INJECTION INTRAMUSCULAR; INTRAVENOUS at 17:07

## 2021-05-04 ASSESSMENT — PAIN DESCRIPTION - PROGRESSION: CLINICAL_PROGRESSION: NOT CHANGED

## 2021-05-04 ASSESSMENT — PAIN SCALES - GENERAL
PAINLEVEL_OUTOF10: 9
PAINLEVEL_OUTOF10: 8

## 2021-05-04 ASSESSMENT — PAIN DESCRIPTION - LOCATION: LOCATION: LEG

## 2021-05-04 ASSESSMENT — PAIN DESCRIPTION - PAIN TYPE: TYPE: ACUTE PAIN

## 2021-05-04 ASSESSMENT — PAIN - FUNCTIONAL ASSESSMENT: PAIN_FUNCTIONAL_ASSESSMENT: 0-10

## 2021-05-04 ASSESSMENT — ENCOUNTER SYMPTOMS
BACK PAIN: 1
NAUSEA: 0

## 2021-05-04 ASSESSMENT — PAIN DESCRIPTION - ONSET: ONSET: ON-GOING

## 2021-05-04 NOTE — ED PROVIDER NOTES
[address]      Pt Name: Haley Helm  MRN: [id number]  [place] 1980  Date of evaluation: 5/4/2021  Provider: Fabby Moore PA-C    This patient was not seen and evaluated by the attending physician No att. providers found. CHIEF COMPLAINT       Chief Complaint   Patient presents with    Leg Pain     right lateral leg pain for 2 days. pt descrobes pain as shooting         HISTORYOF PRESENT ILLNESS  (Location/Symptom, Timing/Onset, Context/Setting, Quality, Duration, Modifying Factors, Severity.)   Haley Helm is a 39 y.o. female who presents to the emergency department with low back pain that radiates down into her buttock, right hip and down the right leg. Pain started about 3 days ago. It worsens if she is up and ambulating and she feels as though she is walking with a limp because of the pain. She has taken nothing for it. She rates it as 9 out of 10. She denies any associated numbness or weakness of her extremities, saddle anesthesia, loss of control of bowel or bladder. No fevers or chills. No history of injury. Nursing Notes were reviewedand I agree. REVIEW OF SYSTEMS    (2-9 systems for level 4, 10 or more forlevel 5)     Review of Systems   Constitutional: Negative for chills and fever. Gastrointestinal: Negative for nausea. Genitourinary: Negative for difficulty urinating. Musculoskeletal: Positive for arthralgias, back pain and myalgias. Negative for neck pain. Skin: Negative for rash and wound. Neurological: Negative for weakness and numbness. All other systems reviewed and are negative. Except as noted above the remainder ofthe review of systems was reviewed and negative.        PAST MEDICALHISTORY         Diagnosis Date    Asthma     Atrial fibrillation (HCC)     Conduct disorder     [facility]     [facility]     Diabetes mellitus, type 2 ([facility]     Hypothyroidism     Pneumonia     Scoliosis     Scoliosis 1995    Sleep apnea     CPAP       SURGICAL HISTORY           Procedure Laterality Date    APPENDECTOMY      KNEE ARTHROSCOPY Right 07/01/2016    LUNG SURGERY         CURRENT MEDICATIONS       Discharge Medication List as of 5/4/2021  4:47 PM      CONTINUE these medications which have NOT CHANGED    Details   ketoconazole (NIZORAL) 2 % shampoo Apply topically once daily until healed. , Disp-1 Bottle, R-2, Print      nystatin-triamcinolone (MYCOLOG) 315416-0.1 UNIT/GM-% ointment Apply topically 2 times daily to breast line and abdomen and then apply powder over the ointment, Disp-1 Tube, R-2, Print      furosemide (LASIX) 20 MG tablet Take 1 tablet by mouth daily, Disp-30 tablet, R-0      aspirin 81 MG chewable tablet Take 81 mg by mouth daily      loratadine (CLARITIN) 10 MG tablet Take 10 mg by mouth daily      CYCLAFEM 1/35 1-35 MG-MCG per tablet 1 tablet daily, ASHLEY      LORazepam (ATIVAN) 0.5 MG tablet 1 tablet every morning (before breakfast)      metoprolol (LOPRESSOR) 25 MG tablet TAKE ONE (1) TABLET BY MOUTH TWICE A DAY FOR TACHYCARDIA, Disp-60 tablet, R-6      ipratropium (ATROVENT HFA) 17 MCG/ACT inhaler Inhale 2 puffs into the lungs 4 times daily. therapeutic multivitamin-minerals (THERAGRAN-M) tablet Take 1 tablet by mouth daily. metFORMIN (GLUCOPHAGE) 500 MG tablet Take 500 mg by mouth 2 times daily (with meals). docusate sodium (COLACE) 100 MG capsule Take 100 mg by mouth daily. cloZAPine (CLOZARIL) 25 MG tablet Take 25 mg by mouth daily. topiramate (TOPAMAX) 50 MG tablet Take 50 mg by mouth nightly. benztropine (COGENTIN) 0.5 MG tablet Take 0.5 mg by mouth daily. levothyroxine (SYNTHROID) 75 MCG tablet Take 75 mcg by mouth Daily. Calcium-Vitamin D-Vitamin K (VIACTIV PO) Take 1 tablet by mouth 3 times daily.       divalproex (DEPAKOTE) 500 MG ER tablet Take 1,000 mg by mouth nightly. budesonide-formoterol (SYMBICORT) 80-4.5 MCG/ACT AERO Inhale 2 puffs into the lungs 2 times daily. FLUoxetine (PROZAC) 20 MG capsule Take 20 mg by mouth daily. ALLERGIES     Diphenoxylate-atropine, Lamictal [lamotrigine], and Pcn [penicillins]    FAMILY HISTORY           Problem Relation Age of Onset    Heart Failure Mother     Depression Father     Emphysema Paternal Grandfather     Liver Disease Maternal Aunt     Diabetes Paternal Grandmother      Family Status   Relation Name Status    Mother  Alive    Father      PGF     Zane Obando      PGM  (Not Specified)        SOCIAL HISTORY    reports that she has been smoking cigarettes. She started smoking about 26 years ago. She has a 7.50 pack-year smoking history. She has never used smokeless tobacco. She reports that she does not drink alcohol or use drugs. PHYSICAL EXAM    (up to 7 for level 4, 8 or more for level 5)     ED Triage Vitals [21 1335]   BP Temp Temp Source Pulse Resp SpO2 Height Weight   112/72 97.3 °F (36.3 °C) Tympanic 78 18 94 % -- --       Physical Exam  Vitals signs and nursing note reviewed. Constitutional:       General: She is not in acute distress. Appearance: She is well-developed. HENT:      Head: Normocephalic and atraumatic. Neck:      Musculoskeletal: Neck supple. Cardiovascular:      Pulses: Normal pulses. Pulmonary:      Effort: Pulmonary effort is normal. No respiratory distress. Musculoskeletal:      Comments: Nonspecific diffuse tenderness across the lower back and right upper buttock. No skin changes, ecchymosis, crepitance. Skin:     General: Skin is warm and dry. Neurological:      Mental Status: She is alert and oriented to person, place, and time. Comments: Equal strength and sensation bilateral lower extremities. Intact flexion and extension at the hips, knees, ankles and toes.    Psychiatric:         Behavior: Behavior normal. DIAGNOSTIC RESULTS     RADIOLOGY:   Non-plain film images such as CT, Ultrasound and MRI are read by the radiologist.Plain radiographic images are visualized and preliminarily interpreted by Charisma Martines PA-C with the below findings:        Interpretation per the Radiologist below, if available at the time of this note:    XR LUMBAR SPINE (2-3 VIEWS)   Final Result   No acute osseous abnormality      Degenerative disc disease, greatest at L5-S1             LABS:  Labs Reviewed   PREGNANCY, URINE    Narrative:     Performed at:  [place] [address], [place] [facility] [address]   Phone [phone]       All other labs were within normal range or not returned as of this dictation. EMERGENCY DEPARTMENT COURSE and DIFFERENTIAL DIAGNOSIS/MDM:   Vitals:    Vitals:    05/04/21 1335 05/04/21 1706   BP: 112/72 112/72   Pulse: 78 78   Resp: 18 16   Temp: 97.3 °F (36.3 °C) 97.3 °F (36.3 °C)   TempSrc: Tympanic Oral   SpO2: 94%         I have evaluated this patient. My supervising physician was available for consultation. Patient is neurovascularly intact. She has no acute red flag signs or symptoms concerning for acute neurosurgical emergency. X-ray of the lumbar spine was obtained after negative pregnancy test and this showed some degenerative changes only. She was treated here with Toradol and Norflex and will be discharged with the same with referral to follow-up with her PCP if no better in 2 to 3 days. Discussed results, diagnosis and plan with patient and/or family. Questions addressed. Dispositionand follow-up agreed upon. Specific discharge instructions explained. The patient and/or family and I have discussed the diagnosis and risks, and we agree with discharging home to follow-up with their primary care,specialist or referral doctor. We also discussed returning to the Emergency Department immediately if new or worsening symptoms occur.  We have discussed the symptoms which are most concerning that necessitate immediatereturn. PROCEDURES:  None    FINAL IMPRESSION      1.  Sciatica of right side          DISPOSITION/PLAN   DISPOSITION Decision To Discharge 05/04/2021 04:17:24 PM      PATIENT REFERRED TO:  Tsering Rivers  [facility]  [address]  [phone]    Schedule an appointment as soon as possible for a visit in 2 days      [facility] Emergency Department  [address] [facility] [address]  [phone]    If symptoms worsen      MEDICATIONS:  Discharge Medication List as of 5/4/2021  4:47 PM      START taking these medications    Details   ketorolac (TORADOL) 10 MG tablet Take 1 tablet by mouth three times daily for 5 days, Disp-15 tablet, R-0Normal      orphenadrine (NORFLEX) 100 MG extended release tablet Take 1 tablet by mouth 2 times daily for 5 days, Disp-10 tablet, R-0Normal             (Please note that portions of this note were completed with a voice recognition program.  Efforts were made toedit the dictations but occasionally words are mis-transcribed.)    MARY Arzate PA-C  05/04/21 1170

## 2021-06-22 ENCOUNTER — TELEPHONE (OUTPATIENT)
Dept: CARDIOLOGY CLINIC | Age: 41
End: 2021-06-22

## 2021-06-22 NOTE — TELEPHONE ENCOUNTER
Patient saw Rebekah Rowe and Ana Barboza in 2015 for tachycardia. Her caregiver called today because she has been complaining of tachycardia. Appt scheduled for 9/2/21 with PHILIP. Caregiver is Rudell Councilman. Her phone number is [phone].

## 2021-08-13 ENCOUNTER — TELEPHONE (OUTPATIENT)
Dept: CARDIOLOGY CLINIC | Age: 41
End: 2021-08-13

## 2021-08-19 ENCOUNTER — OFFICE VISIT (OUTPATIENT)
Dept: PULMONOLOGY | Age: 41
End: 2021-08-19
Payer: MEDICARE

## 2021-08-19 VITALS
HEIGHT: 72 IN | BODY MASS INDEX: 32.91 KG/M2 | SYSTOLIC BLOOD PRESSURE: 110 MMHG | HEART RATE: 90 BPM | WEIGHT: 243 LBS | TEMPERATURE: 95.9 F | RESPIRATION RATE: 14 BRPM | OXYGEN SATURATION: 93 % | DIASTOLIC BLOOD PRESSURE: 70 MMHG

## 2021-08-19 DIAGNOSIS — Z72.0 TOBACCO ABUSE: ICD-10-CM

## 2021-08-19 DIAGNOSIS — I27.20 PULMONARY HYPERTENSION (HCC): ICD-10-CM

## 2021-08-19 DIAGNOSIS — J96.11 CHRONIC RESPIRATORY FAILURE WITH HYPOXIA (HCC): ICD-10-CM

## 2021-08-19 DIAGNOSIS — J45.20 MILD INTERMITTENT ASTHMA WITHOUT COMPLICATION: ICD-10-CM

## 2021-08-19 DIAGNOSIS — G47.33 OSA (OBSTRUCTIVE SLEEP APNEA): Primary | ICD-10-CM

## 2021-08-19 DIAGNOSIS — J45.40 MODERATE PERSISTENT ASTHMA WITHOUT COMPLICATION: ICD-10-CM

## 2021-08-19 PROCEDURE — 4004F PT TOBACCO SCREEN RCVD TLK: CPT | Performed by: INTERNAL MEDICINE

## 2021-08-19 PROCEDURE — 99214 OFFICE O/P EST MOD 30 MIN: CPT | Performed by: INTERNAL MEDICINE

## 2021-08-19 PROCEDURE — G8427 DOCREV CUR MEDS BY ELIG CLIN: HCPCS | Performed by: INTERNAL MEDICINE

## 2021-08-19 PROCEDURE — G8417 CALC BMI ABV UP PARAM F/U: HCPCS | Performed by: INTERNAL MEDICINE

## 2021-08-19 RX ORDER — ALBUTEROL SULFATE 90 UG/1
2 AEROSOL, METERED RESPIRATORY (INHALATION) EVERY 6 HOURS PRN
Qty: 1 INHALER | Refills: 5 | Status: SHIPPED | OUTPATIENT
Start: 2021-08-19

## 2021-08-19 ASSESSMENT — ASTHMA QUESTIONNAIRES
QUESTION_3 LAST FOUR WEEKS HOW OFTEN DID YOUR ASTHMA SYMPTOMS (WHEEZING, COUGHING, SHORTNESS OF BREATH, CHEST TIGHTNESS OR PAIN) WAKE YOU UP AT NIGHT OR EARLIER THAN USUAL IN THE MORNING: 4
QUESTION_4 LAST FOUR WEEKS HOW OFTEN HAVE YOU USED YOUR RESCUE INHALER OR NEBULIZER MEDICATION (SUCH AS ALBUTEROL): 5
QUESTION_1 LAST FOUR WEEKS HOW MUCH OF THE TIME DID YOUR ASTHMA KEEP YOU FROM GETTING AS MUCH DONE AT WORK, SCHOOL OR AT HOME: 5
QUESTION_2 LAST FOUR WEEKS HOW OFTEN HAVE YOU HAD SHORTNESS OF BREATH: 4
ACT_TOTALSCORE: 22
QUESTION_5 LAST FOUR WEEKS HOW WOULD YOU RATE YOUR ASTHMA CONTROL: 4

## 2021-08-19 ASSESSMENT — ENCOUNTER SYMPTOMS: RESPIRATORY NEGATIVE: 1

## 2021-08-19 NOTE — PROGRESS NOTES
[facility]   Electrophysiology  Office Visit  New Patient  Date: 8/25/2021    Chief Complaint   Patient presents with    Atrial Fibrillation    Palpitations    Tachycardia    Chest Pain    Shortness of Breath       Cardiac HX: Jone Hill is a 39 y.o. woman with a h/o DM, COPD, asthma, FREIDA, and paroxysmal AF, last seen by EP in 2016, here for c/o palpitations and tachycardia. Interval History/HPI: Patient is here to be seen for palpitations, tachycardia and atrial fibrillation. Patient currently resides in a group home and is accompanied by the  today. Patient states that she has occasional heart racing and palpitations. This happens mainly when she is lying in bed and she hears and feels her heart beating. She notices that her palpitations are worse when she gets upset. She does have a history of atrial however is not sure if she has had any breakthrough or not. She does complain of occasional chest discomfort that she describes as a pinching in her chest.  It last a couple minutes. She does complain of shortness of breath and has a history of asthma so she is unsure if it is her asthma or something else. Chest discomfort comfort can happen at any time, is not related to any activity and does not wake her at night. She denies any PND, orthopnea or lower extremity edema. She does have psoriasis around her neck which is very painful. She continues to smoke about 6 cigarettes a day and states that she had really cut down. Home medications:   Current Outpatient Medications on File Prior to Visit   Medication Sig Dispense Refill    albuterol sulfate  (90 Base) MCG/ACT inhaler Inhale 2 puffs into the lungs every 6 hours as needed for Wheezing or Shortness of Breath 1 Inhaler 5    ketoconazole (NIZORAL) 2 % shampoo Apply topically once daily until healed.  1 Bottle 2    nystatin-triamcinolone (MYCOLOG) 486787-4.1 UNIT/GM-% ointment Apply topically 2 times daily to breast line and abdomen and then apply powder over the ointment 1 Tube 2    furosemide (LASIX) 20 MG tablet Take 1 tablet by mouth daily 30 tablet 0    aspirin 81 MG chewable tablet Take 81 mg by mouth daily      loratadine (CLARITIN) 10 MG tablet Take 10 mg by mouth daily      CYCLAFEM 1/35 1-35 MG-MCG per tablet 1 tablet daily      LORazepam (ATIVAN) 0.5 MG tablet 1 tablet every morning (before breakfast)      metoprolol (LOPRESSOR) 25 MG tablet TAKE ONE (1) TABLET BY MOUTH TWICE A DAY FOR TACHYCARDIA 60 tablet 6    ipratropium (ATROVENT HFA) 17 MCG/ACT inhaler Inhale 2 puffs into the lungs 4 times daily.  therapeutic multivitamin-minerals (THERAGRAN-M) tablet Take 1 tablet by mouth daily.  metFORMIN (GLUCOPHAGE) 500 MG tablet Take 500 mg by mouth 2 times daily (with meals).  docusate sodium (COLACE) 100 MG capsule Take 100 mg by mouth daily.  cloZAPine (CLOZARIL) 25 MG tablet Take 25 mg by mouth daily.  topiramate (TOPAMAX) 50 MG tablet Take 50 mg by mouth nightly.  benztropine (COGENTIN) 0.5 MG tablet Take 0.5 mg by mouth daily.  levothyroxine (SYNTHROID) 100 MCG tablet Take 100 mcg by mouth Daily       Calcium-Vitamin D-Vitamin K (VIACTIV PO) Take 1 tablet by mouth 3 times daily.  divalproex (DEPAKOTE) 500 MG ER tablet Take 1,000 mg by mouth nightly.  budesonide-formoterol (SYMBICORT) 80-4.5 MCG/ACT AERO Inhale 2 puffs into the lungs 2 times daily.  FLUoxetine (PROZAC) 20 MG capsule Take 20 mg by mouth daily.  ketorolac (TORADOL) 10 MG tablet Take 1 tablet by mouth three times daily for 5 days 15 tablet 0     No current facility-administered medications on file prior to visit.        Past Medical History:   Diagnosis Date    Abnormal radiograph 9/28/2015    Asthma     Atrial fibrillation ([facility])     Conduct disorder     COPD (chronic obstructive pulmonary disease) ([facility])     Diabetes mellitus [facility]     Diabetes mellitus, type 2 ([facility]  Hypothyroidism     Pneumonia     Scoliosis     Scoliosis 1995    Sleep apnea     CPAP        Past Surgical History:   Procedure Laterality Date    APPENDECTOMY      KNEE ARTHROSCOPY Right 07/01/2016    LUNG SURGERY         Allergies   Allergen Reactions    Diphenoxylate-Atropine     Lamictal [Lamotrigine]     Pcn [Penicillins]        Social History:  Reviewed. reports that she has been smoking cigarettes. She started smoking about 26 years ago. She has a 7.50 pack-year smoking history. She has never used smokeless tobacco. She reports that she does not drink alcohol and does not use drugs. Family History:  Reviewed. family history includes Depression in her father; Diabetes in her paternal grandmother; Emphysema in her paternal grandfather; Heart Failure in her mother; Liver Disease in her maternal aunt. Review of System:    · Constitutional: No fevers, chills. · Eyes: No visual changes or diplopia. No scleral icterus. · ENT: No Headaches. No mouth sores or sore throat. · Cardiovascular: Yes for chest pain, Yes for dyspnea on exertion, Yes for palpitations or No for loss of consciousness. No cough, hemoptysis, No for pleuritic pain, or phlebitis. · Respiratory: No for cough or wheezing. No hematemesis. · Gastrointestinal: No abdominal pain, blood in stools. · Genitourinary: No dysuria, or hematuria. · Musculoskeletal: No gait disturbance,    · Integumentary: No rash or pruritis. · Neurological: No headache, change in muscle strength, numbness or tingling. · Psychiatric: No anxiety, or depression. · Endocrine: No temperature intolerance. No excessive thirst, fluid intake, or urination. · Hem/Lymph: No abnormal bruising or bleeding, blood clots or swollen lymph nodes. · Allergic/Immunologic: No nasal congestion or hives.     Physical Examination:  Vitals:    08/25/21 1424   BP: 110/68   Pulse: 85         Wt Readings from Last 3 Encounters:   08/25/21 244 lb 3.2 oz (110.8 kg) 08/19/21 243 lb (110.2 kg)   04/01/21 236 lb (107 kg)       · Constitutional: Oriented. No distress. · Head: Normocephalic and atraumatic. · Mouth/Throat: Oropharynx is clear and moist.   · Eyes: Conjunctivae clear without jaunduice. PERRL. · Neck: Neck supple. No rigidity. No JVD present. · Cardiovascular: Normal rate, regular rhythm, S1&S2. · Pulmonary/Chest: Bilateral respiratory sounds. No wheezes, No rhonchi. · Abdominal: Soft. Bowel sounds present. No distension, No tenderness. · Musculoskeletal: No tenderness. No edema    · Lymphadenopathy: Has no cervical adenopathy. · Neurological: Alert and oriented. Cranial nerve appears intact, No Gross deficit   · Skin: Skin is warm and dry. No rash noted. · Psychiatric: Has a normal mood, affect and behavior     Labs:  Reviewed. No results for input(s): NA, K, CL, CO2, PHOS, BUN, CREATININE in the last 72 hours. Invalid input(s): CA,  TSH  No results for input(s): WBC, HGB, HCT, MCV, PLT in the last 72 hours. Lab Results   Component Value Date    TROPONINI 0.00 10/11/2013     No results found for: BNP  Lab Results   Component Value Date    PROTIME 10.7 09/24/2015    INR 0.99 09/24/2015     No results found for: CHOL, HDL, TRIG    ECG: Personally reviewed: NSR, HR 85, , QRS 96, QTc 407    ECHO: 6/4/2020  Summary   Normal LV size & wall thickness;   EF    55-60%. No wall motion abnormalities   Left ventricular cavity size is normal.   Normal left ventricular wall thickness. Ejection fraction is visually estimated to be 60-65%. Mitral valve leaflets appear mildly thickened. The mitral valve is normal in structure and function. Mild mitral regurgitation. Aortic valve leaflets appear thickened. Mild tricuspid regurgitation, RVSP 28 mmHg, RAP 3 mmHg. Stress Test: N/A    Cardiac Angiography: 1/7/2016 [facility]  FINDINGS:   1.  Relatively normal pulmonary capillary wedge pressure at 15 mmHg.   The  right atrial pressure was slightly elevated at 10 mmHg. 2.  Borderline pulmonary hypertension with a pressure of 43/17 and a mean  pulmonary arterial pressure of 29 mmHg. It was difficult for the patient to  cooperate with breathing instructions and there was somewhat of a fluctuation  in the mean pressures. I do think that the mean pressure of 29 mmHg is  accurate. 3.  Normal oxygen saturations in the superior vena cava at 75% and right  atrium at 68%. The pulmonary arterial oxygen saturation was 73%. 4.  Normal cardiac output by thermodilution at 5.79 liters per minute with a  cardiac index of 2.46 liters per kilogram per minute. By Bridgette Canela the  cardiac output is 7.52 liters per minute with a cardiac index of 3.2 liters  per kilogram per minute. Pulmonary vascular resistance is 193. Javier Vasquez MD       Problem List:   Patient Active Problem List    Diagnosis Date Noted    Chronic respiratory failure with hypoxia ([facility] 02/23/2017    FREIDA (obstructive sleep apnea) 02/23/2017    Muscle weakness 10/14/2016    Shortness of breath 08/18/2016    Chondromalacia patellae 06/07/2016    Osteonecrosis of right knee region [facility]) 06/07/2016    Obstructive sleep apnea     Abnormal PFTs 12/17/2015    Hypoxia 12/17/2015    Moderate persistent asthma without complication 56/39/3540    Atelectasis     Acute respiratory failure with hypoxia and hypercapnia ([address]     Tobacco abuse     Diabetes ([address] 12/12/2013    Scoliosis 12/12/2013    Pulmonary hypertension ([facility] 08/19/2021        Assessment:   1. Tachycardia    2. Paroxysmal atrial fibrillation (HCC)    3. Palpitations         Cardiac HX: Magan Miner is a 39 y.o. woman, is in a group home, with a h/o DM, COPD, asthma, FREIDA on CPAP, and paroxysmal AF, last seen by EP in 2016, here for c/o paplitations and tachycardia. [id number] 2. TSH 5.11 (7/13/21).      pAF/tachycardia/palpitations  - In NSR  - Some palpitations per patient, unable to tell if she has AF  - On metoprolol 25 mg twice daily  - 2 week CAM  - Echo  - Consider MPI if echo abnormal  - F/u in 2-3 months  -On an aspirin daily  - ECG ordered and results personally reviewed       EF of 55 to 72%  No systolic HF   No known CAD  Not on anticoagulation for AF     Tobacco use was discussed with the patient and education provided. All questions and concerns were addressed to the patient/family. Alternatives to my treatment were discussed. The note was completed using EMR. Every effort was made to ensure accuracy; however, inadvertent computerized transcription errors may be present. Patient received education regarding their diagnosis, treatment and medications while in the office today. Archie Pending sale to [facility]         I  have spent 45 minutes in care of the patient including direct face to face time, chart preparation, reviewing diagnostic testing, other provider notes and coordinating patient care.

## 2021-08-19 NOTE — PROGRESS NOTES
oropharynx clear. External appearance of ears and nose normal.  Neck: Trachea midline. No mass   Resp:  No crackles. No wheezes. No rhonchi. No dullness on percussion. CV: Regular rate. Regular rhythm. No murmur or rub. No edema. GI: Soft, Non-tender. Non-distended. +BS  Skin: Warm, dry, w/o erythema. Lymph: No cervical or supraclavicular LAD. M/S: No cyanosis. No clubbing. Neuro:  no focal neurologic deficit. Moves all extremities  Psych: Awake and alert, Oriented x 3. Judgement and insight appropriate. Mood stable. An electronic signature was used to authenticate this note.     --Noel Pike MD

## 2021-08-25 ENCOUNTER — OFFICE VISIT (OUTPATIENT)
Dept: CARDIOLOGY CLINIC | Age: 41
End: 2021-08-25
Payer: MEDICARE

## 2021-08-25 VITALS
WEIGHT: 244.2 LBS | HEIGHT: 72 IN | SYSTOLIC BLOOD PRESSURE: 110 MMHG | BODY MASS INDEX: 33.08 KG/M2 | DIASTOLIC BLOOD PRESSURE: 68 MMHG | HEART RATE: 85 BPM

## 2021-08-25 DIAGNOSIS — R00.2 PALPITATIONS: ICD-10-CM

## 2021-08-25 DIAGNOSIS — R00.0 TACHYCARDIA: Primary | ICD-10-CM

## 2021-08-25 DIAGNOSIS — I48.0 PAROXYSMAL ATRIAL FIBRILLATION (HCC): ICD-10-CM

## 2021-08-25 PROCEDURE — G8427 DOCREV CUR MEDS BY ELIG CLIN: HCPCS | Performed by: NURSE PRACTITIONER

## 2021-08-25 PROCEDURE — 4004F PT TOBACCO SCREEN RCVD TLK: CPT | Performed by: NURSE PRACTITIONER

## 2021-08-25 PROCEDURE — G8417 CALC BMI ABV UP PARAM F/U: HCPCS | Performed by: NURSE PRACTITIONER

## 2021-08-25 PROCEDURE — 93000 ELECTROCARDIOGRAM COMPLETE: CPT | Performed by: NURSE PRACTITIONER

## 2021-08-25 PROCEDURE — 99204 OFFICE O/P NEW MOD 45 MIN: CPT | Performed by: NURSE PRACTITIONER

## 2021-08-31 ENCOUNTER — NURSE ONLY (OUTPATIENT)
Dept: CARDIOLOGY CLINIC | Age: 41
End: 2021-08-31

## 2021-08-31 PROCEDURE — 93246 EXT ECG>7D<15D RECORDING: CPT | Performed by: INTERNAL MEDICINE

## 2021-09-25 ENCOUNTER — APPOINTMENT (OUTPATIENT)
Dept: GENERAL RADIOLOGY | Age: 41
End: 2021-09-25
Payer: MEDICARE

## 2021-09-25 ENCOUNTER — HOSPITAL ENCOUNTER (EMERGENCY)
Age: 41
Discharge: HOME | End: 2021-09-25
Attending: EMERGENCY MEDICINE
Payer: MEDICARE

## 2021-09-25 VITALS
BODY MASS INDEX: 33.37 KG/M2 | OXYGEN SATURATION: 93 % | WEIGHT: 246.03 LBS | SYSTOLIC BLOOD PRESSURE: 117 MMHG | HEART RATE: 86 BPM | DIASTOLIC BLOOD PRESSURE: 86 MMHG | RESPIRATION RATE: 18 BRPM | TEMPERATURE: 98 F

## 2021-09-25 DIAGNOSIS — M25.512 ACUTE PAIN OF LEFT SHOULDER: Primary | ICD-10-CM

## 2021-09-25 PROCEDURE — 73030 X-RAY EXAM OF SHOULDER: CPT

## 2021-09-25 PROCEDURE — 71045 X-RAY EXAM CHEST 1 VIEW: CPT

## 2021-09-25 PROCEDURE — 6370000000 HC RX 637 (ALT 250 FOR IP): Performed by: EMERGENCY MEDICINE

## 2021-09-25 PROCEDURE — 93005 ELECTROCARDIOGRAM TRACING: CPT | Performed by: EMERGENCY MEDICINE

## 2021-09-25 PROCEDURE — 99283 EMERGENCY DEPT VISIT LOW MDM: CPT

## 2021-09-25 RX ORDER — NAPROXEN 250 MG/1
500 TABLET ORAL ONCE
Status: COMPLETED | OUTPATIENT
Start: 2021-09-25 | End: 2021-09-25

## 2021-09-25 RX ORDER — CYCLOBENZAPRINE HCL 10 MG
10 TABLET ORAL ONCE
Status: COMPLETED | OUTPATIENT
Start: 2021-09-25 | End: 2021-09-25

## 2021-09-25 RX ORDER — NAPROXEN 500 MG/1
500 TABLET ORAL 2 TIMES DAILY PRN
Qty: 60 TABLET | Refills: 0 | Status: SHIPPED | OUTPATIENT
Start: 2021-09-25 | End: 2021-10-25 | Stop reason: ALTCHOICE

## 2021-09-25 RX ORDER — CYCLOBENZAPRINE HCL 10 MG
10 TABLET ORAL 3 TIMES DAILY PRN
Qty: 20 TABLET | Refills: 0 | Status: SHIPPED | OUTPATIENT
Start: 2021-09-25 | End: 2021-10-05

## 2021-09-25 RX ADMIN — CYCLOBENZAPRINE HYDROCHLORIDE 10 MG: 10 TABLET, FILM COATED ORAL at 11:55

## 2021-09-25 RX ADMIN — NAPROXEN 500 MG: 250 TABLET ORAL at 11:56

## 2021-09-25 ASSESSMENT — PAIN DESCRIPTION - PAIN TYPE: TYPE: ACUTE PAIN

## 2021-09-25 ASSESSMENT — PAIN SCALES - GENERAL: PAINLEVEL_OUTOF10: 6

## 2021-09-25 ASSESSMENT — PAIN DESCRIPTION - LOCATION: LOCATION: ABDOMEN

## 2021-09-26 LAB
EKG ATRIAL RATE: 79 BPM
EKG DIAGNOSIS: NORMAL
EKG P AXIS: 70 DEGREES
EKG P-R INTERVAL: 156 MS
EKG Q-T INTERVAL: 384 MS
EKG QRS DURATION: 90 MS
EKG QTC CALCULATION (BAZETT): 440 MS
EKG R AXIS: -11 DEGREES
EKG T AXIS: 31 DEGREES
EKG VENTRICULAR RATE: 79 BPM

## 2021-09-26 PROCEDURE — 93010 ELECTROCARDIOGRAM REPORT: CPT | Performed by: INTERNAL MEDICINE

## 2021-10-12 ENCOUNTER — TELEPHONE (OUTPATIENT)
Dept: CARDIOLOGY CLINIC | Age: 41
End: 2021-10-12

## 2021-10-19 PROCEDURE — 93248 EXT ECG>7D<15D REV&INTERPJ: CPT | Performed by: INTERNAL MEDICINE

## 2021-10-20 DIAGNOSIS — R00.2 PALPITATION: ICD-10-CM

## 2021-10-25 ENCOUNTER — HOSPITAL ENCOUNTER (EMERGENCY)
Age: 41
Discharge: HOME | End: 2021-10-25
Attending: EMERGENCY MEDICINE
Payer: MEDICARE

## 2021-10-25 VITALS
HEART RATE: 90 BPM | SYSTOLIC BLOOD PRESSURE: 132 MMHG | TEMPERATURE: 98.8 F | DIASTOLIC BLOOD PRESSURE: 68 MMHG | RESPIRATION RATE: 20 BRPM | OXYGEN SATURATION: 92 % | WEIGHT: 247.14 LBS | BODY MASS INDEX: 33.52 KG/M2

## 2021-10-25 DIAGNOSIS — H60.11 CELLULITIS OF AURICLE OF RIGHT EAR: Primary | ICD-10-CM

## 2021-10-25 PROCEDURE — 6370000000 HC RX 637 (ALT 250 FOR IP): Performed by: EMERGENCY MEDICINE

## 2021-10-25 PROCEDURE — 99283 EMERGENCY DEPT VISIT LOW MDM: CPT

## 2021-10-25 RX ORDER — METOPROLOL TARTRATE 50 MG/1
50 TABLET, FILM COATED ORAL 2 TIMES DAILY
COMMUNITY
End: 2022-06-22 | Stop reason: SDUPTHER

## 2021-10-25 RX ORDER — ATORVASTATIN CALCIUM 40 MG/1
40 TABLET, FILM COATED ORAL DAILY
COMMUNITY

## 2021-10-25 RX ORDER — IBUPROFEN 800 MG/1
800 TABLET ORAL ONCE
Status: COMPLETED | OUTPATIENT
Start: 2021-10-25 | End: 2021-10-25

## 2021-10-25 RX ORDER — ACETAMINOPHEN 325 MG/1
650 TABLET ORAL EVERY 4 HOURS PRN
COMMUNITY

## 2021-10-25 RX ORDER — FOLIC ACID 1 MG/1
1 TABLET ORAL DAILY
COMMUNITY

## 2021-10-25 RX ORDER — CLINDAMYCIN HYDROCHLORIDE 300 MG/1
300 CAPSULE ORAL 3 TIMES DAILY
Qty: 30 CAPSULE | Refills: 0 | Status: SHIPPED | OUTPATIENT
Start: 2021-10-25 | End: 2021-10-27

## 2021-10-25 RX ORDER — CLINDAMYCIN HYDROCHLORIDE 150 MG/1
300 CAPSULE ORAL ONCE
Status: COMPLETED | OUTPATIENT
Start: 2021-10-25 | End: 2021-10-25

## 2021-10-25 RX ORDER — IBUPROFEN 600 MG/1
600 TABLET ORAL 4 TIMES DAILY PRN
Qty: 40 TABLET | Refills: 0 | Status: SHIPPED | OUTPATIENT
Start: 2021-10-25

## 2021-10-25 RX ORDER — LEVOTHYROXINE SODIUM 0.12 MG/1
125 TABLET ORAL DAILY
COMMUNITY

## 2021-10-25 RX ORDER — HYDROXYZINE 50 MG/1
50 TABLET, FILM COATED ORAL DAILY
COMMUNITY

## 2021-10-25 RX ORDER — HYDROCODONE BITARTRATE AND ACETAMINOPHEN 5; 325 MG/1; MG/1
1 TABLET ORAL EVERY 4 HOURS PRN
Qty: 18 TABLET | Refills: 0 | Status: SHIPPED | OUTPATIENT
Start: 2021-10-25 | End: 2021-10-28

## 2021-10-25 RX ORDER — LISINOPRIL 2.5 MG/1
2.5 TABLET ORAL DAILY
COMMUNITY
End: 2022-06-22 | Stop reason: SDUPTHER

## 2021-10-25 RX ADMIN — IBUPROFEN 800 MG: 800 TABLET, FILM COATED ORAL at 11:23

## 2021-10-25 RX ADMIN — CLINDAMYCIN HYDROCHLORIDE 300 MG: 150 CAPSULE ORAL at 11:23

## 2021-10-25 ASSESSMENT — PAIN SCALES - GENERAL: PAINLEVEL_OUTOF10: 5

## 2021-10-25 NOTE — ED PROVIDER NOTES
eMERGENCY dEPARTMENT eNCOUnter      Pt Name: Haleigh Santizo  MRN: [id number]  [place] 1980  Date of evaluation: 10/25/2021  Provider: Miles Pierre MD     [address] [place] [address]       Chief Complaint   Patient presents with    Facial Swelling     redness and swelling and pain to right ear canal, earlobe, and area around ear.  symptoms since 10/24/21. pain at 8-9.   no OTC pain meds taken. drainage noted around ear and inside earlobe. no obvious source of drainage.  Otalgia         HISTORY OF PRESENT ILLNESS   (Location/Symptom, Timing/Onset,Context/Setting, Quality, Duration, Modifying Factors, Severity) Note limiting factors. HPI    Haleigh Santizo is a 39 y.o. female who presents to the emergency department with right ear swelling and red and painful. Patient states has been going on for about 3 days. It is very painful. There is been some drainage. Her hair around the area is matted. Patient is a diabetic but sugar has been well controlled. Patient lives in a group home with caregiver. Patient has multiple medical issues including is a sleep apnea diabetes muscle weakness pulmonary hypertension. Patient has no fever. Patient is vaccinated. Patient has swelling on the right side only. There is no confusion. Nursing Notes were reviewed. REVIEW OFSYSTEMS    (2+ for level 4; 10+ for level 5)   Review of Systems    General: No fevers, chills or night sweats, No weight loss    Head:  No Sore throat,  No Ear Pain on the inside but more on the outside. Ears are red. Chest:  Nontender. No Cough, No SOB,  Chest Pain    GI: No abdominal pain or vomiting    : No dysuria or hematuria    Musculoskeletal: No unrelenting pain or night pain    Neurologic: No bowel or bladder incontinence, No saddle anesthesia, No leg weakness    All other systems reviewed and are negative.         PAST MEDICAL HISTORY     Past Medical History:   Diagnosis Date    Abnormal radiograph 9/28/2015    Asthma     [facility] ([facility]     Conduct disorder     [facility]     Diabetes mellitus ([facility]     Diabetes mellitus, type 2 ([facility])     Hypothyroidism     Pneumonia     Scoliosis     Scoliosis 1995    Sleep apnea     CPAP       SURGICAL HISTORY       Past Surgical History:   Procedure Laterality Date    APPENDECTOMY      KNEE ARTHROSCOPY Right 07/01/2016    LUNG SURGERY         CURRENT MEDICATIONS       Previous Medications    ALBUTEROL SULFATE  (90 BASE) MCG/ACT INHALER    Inhale 2 puffs into the lungs every 6 hours as needed for Wheezing or Shortness of Breath    ASPIRIN 81 MG CHEWABLE TABLET    Take 81 mg by mouth daily    BENZTROPINE (COGENTIN) 0.5 MG TABLET    Take 0.5 mg by mouth daily. BUDESONIDE-FORMOTEROL (SYMBICORT) 80-4.5 MCG/ACT AERO    Inhale 2 puffs into the lungs 2 times daily. CALCIUM-VITAMIN D-VITAMIN K (VIACTIV PO)    Take 1 tablet by mouth 3 times daily. CLOZAPINE (CLOZARIL) 25 MG TABLET    Take 25 mg by mouth daily. CYCLAFEM 1/35 1-35 MG-MCG PER TABLET    1 tablet daily    DIVALPROEX (DEPAKOTE) 500 MG ER TABLET    Take 1,000 mg by mouth nightly. DOCUSATE SODIUM (COLACE) 100 MG CAPSULE    Take 100 mg by mouth daily. FLUOXETINE (PROZAC) 20 MG CAPSULE    Take 20 mg by mouth daily. FUROSEMIDE (LASIX) 20 MG TABLET    Take 1 tablet by mouth daily    IPRATROPIUM (ATROVENT HFA) 17 MCG/ACT INHALER    Inhale 2 puffs into the lungs 4 times daily. KETOCONAZOLE (NIZORAL) 2 % SHAMPOO    Apply topically once daily until healed.     KETOROLAC (TORADOL) 10 MG TABLET    Take 1 tablet by mouth three times daily for 5 days    LEVOTHYROXINE (SYNTHROID) 100 MCG TABLET    Take 100 mcg by mouth Daily     LORATADINE (CLARITIN) 10 MG TABLET    Take 10 mg by mouth daily    LORAZEPAM (ATIVAN) 0.5 MG TABLET    1 tablet every morning (before breakfast)    METFORMIN (GLUCOPHAGE) 500 MG TABLET    Take 500 mg by mouth 2 times daily (with meals). METOPROLOL (LOPRESSOR) 25 MG TABLET    TAKE ONE (1) TABLET BY MOUTH TWICE A DAY FOR TACHYCARDIA    NAPROXEN (NAPROSYN) 500 MG TABLET    Take 1 tablet by mouth 2 times daily as needed for Pain    NYSTATIN-TRIAMCINOLONE (MYCOLOG) 131847-4.1 UNIT/GM-% OINTMENT    Apply topically 2 times daily to breast line and abdomen and then apply powder over the ointment    THERAPEUTIC MULTIVITAMIN-MINERALS (THERAGRAN-M) TABLET    Take 1 tablet by mouth daily. TOPIRAMATE (TOPAMAX) 50 MG TABLET    Take 50 mg by mouth nightly. ALLERGIES     Diphenoxylate-atropine, Lamictal [lamotrigine], and Pcn [penicillins]    FAMILY HISTORY       Family History   Problem Relation Age of Onset    Heart Failure Mother     Depression Father     Emphysema Paternal Grandfather     Liver Disease Maternal Aunt     Diabetes Paternal Grandmother         SOCIAL HISTORY       Social History     Socioeconomic History    Marital status: Single     Spouse name: Not on file    Number of children: Not on file    Years of education: Not on file    Highest education level: Not on file   Occupational History    Not on file   Tobacco Use    Smoking status: Current Every Day Smoker     Packs/day: 0.50     Years: 15.00     Pack years: 7.50     Types: Cigarettes     Start date: 1/1/1995    Smokeless tobacco: Current User   Vaping Use    Vaping Use: Former   Substance and Sexual Activity    Alcohol use: Yes     Alcohol/week: 0.0 standard drinks    Drug use: No    Sexual activity: Not Currently   Other Topics Concern    Not on file   Social History Narrative    Not on file     Social Determinants of Health     Financial Resource Strain:     Difficulty of Paying Living Expenses:    Food Insecurity:     Worried About Running Out of Food in the Last Year:     Ran Out of Food in the Last Year:    Transportation Needs:     Lack of Transportation (Medical):      Lack of Transportation (Non-Medical):    Physical Activity:     Days of Exercise per Week:     Minutes of Exercise per Session:    Stress:     Feeling of Stress :    Social Connections:     Frequency of Communication with Friends and Family:     Frequency of Social Gatherings with Friends and Family:     Attends Protestant Services:     Active Member of Clubs or Organizations:     Attends Club or Organization Meetings:     Marital Status:    Intimate Partner Violence:     Fear of Current or Ex-Partner:     Emotionally Abused:     Physically Abused:     Sexually Abused:        SCREENINGS           PHYSICAL EXAM    (up to 7 for level 4, 8 or more for level 5)     ED Triage Vitals   BP Temp Temp src Pulse Resp SpO2 Height Weight   -- -- -- -- -- -- -- --       Physical Exam    General: Alert and awake ×3. Nontoxic appearance. Well-developed well-nourished obese 44-year-old mentally challenged with right ear pain and redness. HEENT: Normocephalic atraumatic. Neck is supple. Airway intact. No adenopathy. No lymphopathy. There is auricular redness consistent with a cellulitis. It is very tight. Unable to visualize the ear canal because of the swelling inside. No drainage at this time hears Felipa Sos. No adenopathy on cervical neck. Airway was patent. Lungs were clear to auscultation. Cardiac: Regular rate and rhythm with no murmurs rubs or gallops  Pulmonary: Lungs are clear in all lung fields. No wheezing. No Rales. Abdomen: Soft and nontender. Negative hepatosplenomegaly. Bowel sounds are active  Extremities: Moving all extremities. No calf tenderness. Peripheral pulses all intact  Skin: No skin lesions. No rashes  Neurologic: Cranial nerves II through XII was grossly intact. Nonfocal neurological exam  Psychiatric: Patient is pleasant. Mood is appropriate. DIAGNOSTIC RESULTS     EKG (Per Emergency Physician):       RADIOLOGY (Per Emergency Physician):        Interpretation per the Radiologist below, if available at the time of this note:  No results found.    ED BEDSIDE ULTRASOUND:   Performed by ED Physician - none    LABS:  Labs Reviewed - No data to display     All other labs were within normal range or not returned as of this dictation. Procedures      EMERGENCY DEPARTMENT COURSE and DIFFERENTIAL DIAGNOSIS/MDM:   Vitals:    Vitals:    10/25/21 1040   BP: 132/68   Pulse: 90   Resp: 20   Temp: 98.8 °F (37.1 °C)   TempSrc: Oral   SpO2: 92%   Weight: 247 lb 2.2 oz (112.1 kg)       Medications   clindamycin (CLEOCIN) capsule 300 mg (has no administration in time range)   ibuprofen (ADVIL;MOTRIN) tablet 800 mg (has no administration in time range)       MDM. Patient is a 19-year-old no fever normal vital signs. With a cellulitis to the auricular area. It was involving the inner ear canal as well. It is red indurated in the surrounding area no evidence of mastoiditis. Patient placed on clindamycin and Vicodin for pain. Patient discharged in good condition continue ibuprofen. Patient may return if continues to fail outpatient treatment may need IV antibiotics in a week if not better. REVAL:         CRITICAL CARE TIME   Total CriticalCare time was 0 minutes, excluding separately reportable procedures. There was a high probability of clinically significant/life threatening deterioration in the patient's condition which required my urgent intervention. CONSULTS:  None    PROCEDURES:  Unless otherwise noted below, none     [unfilled]    FINAL IMPRESSION      1. Cellulitis of auricle of right ear          DISPOSITION/PLAN   DISPOSITION        PATIENT REFERRED TO:  No follow-up provider specified. DISCHARGE MEDICATIONS:  New Prescriptions    CLINDAMYCIN (CLEOCIN) 300 MG CAPSULE    Take 1 capsule by mouth 3 times daily for 10 days    HYDROCODONE-ACETAMINOPHEN (NORCO) 5-325 MG PER TABLET    Take 1 tablet by mouth every 4 hours as needed for Pain for up to 3 days. Intended supply: 3 days.  Take lowest dose possible to manage pain    IBUPROFEN (ADVIL;MOTRIN) 600 MG TABLET    Take 1 tablet by mouth 4 times daily as needed for Pain          (Please note:  Portions of this note were completed with a voice recognition program.Efforts were made to edit the dictations but occasionally words and phrases are mis-transcribed.)  Form v2016. J.5-cn    Harley HUNT MD (electronically signed)  Emergency Medicine Provider        Reginaldo Weiner MD  10/25/21 3121

## 2021-10-25 NOTE — ED NOTES
redness and swelling and pain to right ear canal, earlobe, and area around ear.  symptoms since 10/24/21. pain at 8-9.   no OTC pain meds taken. drainage noted around ear and inside earlobe. no obvious source of drainage.         Bairon Owens RN  10/25/21 1116

## 2021-10-26 NOTE — ED NOTES
Kash Brennan in registration already called to get permission to treat from [employer]     [facility], [facility]  10/26/21 [id number]

## 2021-10-26 NOTE — ED NOTES
Pt (and caregiver from group home) anxious to be discharged. Discharge instructions reviewed with pt and Taisha (representative from group [place]). Handwritten instructions on AVS to clean right ear area twice daily with soap and water. Explained rx's. Encouraged follow up with PCP in 3 days. Brought pt snack/drink. Ready to be discharged. Home ambulatory. Gait steady. No change in redness to right ear/earlobe. No drainage noted at this time. Pain at 5.  (aware that meds not reconciled in Epic yet from [facility] nursing staff 6 pg med list)      This RN wrote name and phone number in case nursing staff had any questions about wound care/discharge instructions/rx's.        Karolina Hampton RN  10/26/21 [address] [place], RN  10/26/21 [address] [place], RN  10/26/21 9418

## 2021-10-26 NOTE — ED NOTES
Extensive cleaning to right earlobe and surrounding area with hibiclens and saline. Dried drainage noted in earlobe and in hair surrounding ear.  abhay well.      Cierra Nesbitt RN  10/26/21 6132

## 2021-10-27 ENCOUNTER — HOSPITAL ENCOUNTER (EMERGENCY)
Age: 41
Discharge: HOME | End: 2021-10-27
Attending: EMERGENCY MEDICINE
Payer: MEDICARE

## 2021-10-27 VITALS
SYSTOLIC BLOOD PRESSURE: 110 MMHG | HEART RATE: 80 BPM | TEMPERATURE: 98.9 F | RESPIRATION RATE: 14 BRPM | DIASTOLIC BLOOD PRESSURE: 68 MMHG | OXYGEN SATURATION: 91 %

## 2021-10-27 DIAGNOSIS — H02.843 SWELLING OF RIGHT EYELID: ICD-10-CM

## 2021-10-27 DIAGNOSIS — L03.211 FACIAL CELLULITIS: Primary | ICD-10-CM

## 2021-10-27 DIAGNOSIS — H60.311 ACUTE DIFFUSE OTITIS EXTERNA OF RIGHT EAR: ICD-10-CM

## 2021-10-27 PROCEDURE — 6370000000 HC RX 637 (ALT 250 FOR IP): Performed by: EMERGENCY MEDICINE

## 2021-10-27 PROCEDURE — 99284 EMERGENCY DEPT VISIT MOD MDM: CPT

## 2021-10-27 RX ORDER — CIPROFLOXACIN AND DEXAMETHASONE 3; 1 MG/ML; MG/ML
4 SUSPENSION/ DROPS AURICULAR (OTIC) ONCE
Status: DISCONTINUED | OUTPATIENT
Start: 2021-10-27 | End: 2021-10-27

## 2021-10-27 RX ORDER — CIPROFLOXACIN HYDROCHLORIDE 3.5 MG/ML
4 SOLUTION/ DROPS TOPICAL ONCE
Status: COMPLETED | OUTPATIENT
Start: 2021-10-27 | End: 2021-10-27

## 2021-10-27 RX ORDER — OFLOXACIN 3 MG/ML
5 SOLUTION AURICULAR (OTIC) DAILY
Status: DISCONTINUED | OUTPATIENT
Start: 2021-10-27 | End: 2021-10-27

## 2021-10-27 RX ORDER — CIPROFLOXACIN 500 MG/1
500 TABLET, FILM COATED ORAL 2 TIMES DAILY
Qty: 20 TABLET | Refills: 0 | Status: SHIPPED | OUTPATIENT
Start: 2021-10-27 | End: 2021-11-06

## 2021-10-27 RX ORDER — CIPROFLOXACIN AND DEXAMETHASONE 3; 1 MG/ML; MG/ML
4 SUSPENSION/ DROPS AURICULAR (OTIC) 2 TIMES DAILY
Qty: 7.5 ML | Refills: 0 | Status: SHIPPED | OUTPATIENT
Start: 2021-10-27 | End: 2021-11-06

## 2021-10-27 RX ORDER — DEXAMETHASONE SODIUM PHOSPHATE 1 MG/ML
4 SOLUTION/ DROPS OPHTHALMIC ONCE
Status: COMPLETED | OUTPATIENT
Start: 2021-10-27 | End: 2021-10-27

## 2021-10-27 RX ORDER — CIPROFLOXACIN 500 MG/1
500 TABLET, FILM COATED ORAL ONCE
Status: COMPLETED | OUTPATIENT
Start: 2021-10-27 | End: 2021-10-27

## 2021-10-27 RX ADMIN — DEXAMETHASONE SODIUM PHOSPHATE 4 DROP: 1 SOLUTION/ DROPS OPHTHALMIC at 12:05

## 2021-10-27 RX ADMIN — CIPROFLOXACIN 500 MG: 500 TABLET, FILM COATED ORAL at 11:20

## 2021-10-27 NOTE — ED PROVIDER NOTES
CHIEF COMPLAINT  Cellulitis (dx with facial cellulitis monday,, pt states swelling is now up to her eye lid, denies vision changes, denies increased pain )      HISTORY OF PRESENT ILLNESS  Emil Thomas is a 39 y.o. female presents to the ED with presents emergency department chief complaint of right-sided facial swelling. She states that she woke up today and noted that she was having some swelling around the right eye. She states that she was seen the 25th and diagnosed with a facial cellulitis  She has been taking clindamycin 300 mg 3 times a day with some improvement of pain in the right ear. She states that she is continues to have significant right-sided ear drainage and noted swelling in the face today prompting her to come in for further evaluation. Patient denies any difficulty breathing, difficulty swallowing, she denies any fevers, chills. She is never had similar symptoms in the past.  Pain is unchanged by eating or drinking. Patient denies any other complaints at this time. .  No other complaints, modifyingfactors or associated symptoms. I havereviewed the following from the nursing documentation.     Past Medical History:   Diagnosis Date    Abnormal radiograph 9/28/2015    Asthma     Atrial fibrillation (HCC)     Bipolar 1 disorder (HCC)     Conduct disorder     Conduct disturbance     COPD (chronic obstructive pulmonary disease) (HCC)     Diabetes mellitus ([address]     Diabetes mellitus, type 2 (HCC)     Headache     Hypothyroidism     Hypothyroidism     Intellectual disability     Pneumonia     Schizophrenia ([address]     Scoliosis     Scoliosis 1995    Sleep apnea     CPAP     Past Surgical History:   Procedure Laterality Date    APPENDECTOMY      KNEE ARTHROSCOPY Right 07/01/2016    LUNG SURGERY       Family History   Problem Relation Age of Onset    Heart Failure Mother     Depression Father     Emphysema Paternal Grandfather     Liver Disease Maternal Aunt     in ear(s) 2 times daily for 10 days 7.5 mL 0    ibuprofen (ADVIL;MOTRIN) 600 MG tablet Take 1 tablet by mouth 4 times daily as needed for Pain 40 tablet 0    HYDROcodone-acetaminophen (NORCO) 5-325 MG per tablet Take 1 tablet by mouth every 4 hours as needed for Pain for up to 3 days. Intended supply: 3 days. Take lowest dose possible to manage pain 18 tablet 0    acetaminophen (TYLENOL) 325 MG tablet Take 650 mg by mouth every 4 hours as needed for Pain      Emollient (AQUAPHILIC EX) Apply topically daily      ARTIFICIAL TEAR OP Apply 1 drop to eye 3 times daily      atorvastatin (LIPITOR) 40 MG tablet Take 40 mg by mouth daily      ciclopirox (LOPROX) 0.77 % cream Apply topically 2 times daily Apply topically 2 times daily.-to feet      CLOZAPINE PO Take 50 mg by mouth nightly      folic acid (FOLVITE) 1 MG tablet Take 1 mg by mouth daily      hydrOXYzine (ATARAX) 50 MG tablet Take 50 mg by mouth daily      levothyroxine (SYNTHROID) 125 MCG tablet Take 125 mcg by mouth Daily      lisinopril (PRINIVIL;ZESTRIL) 2.5 MG tablet Take 2.5 mg by mouth daily      METHOTREXATE PO Take 10 mg by mouth once a week mondays      metoprolol tartrate (LOPRESSOR) 50 MG tablet Take 50 mg by mouth 2 times daily      albuterol sulfate  (90 Base) MCG/ACT inhaler Inhale 2 puffs into the lungs every 6 hours as needed for Wheezing or Shortness of Breath 1 Inhaler 5    furosemide (LASIX) 20 MG tablet Take 1 tablet by mouth daily 30 tablet 0    aspirin 81 MG chewable tablet Take 81 mg by mouth daily      loratadine (CLARITIN) 10 MG tablet Take 10 mg by mouth daily      CYCLAFEM 1/35 1-35 MG-MCG per tablet 1 tablet daily      LORazepam (ATIVAN) 0.5 MG tablet 1 tablet every morning (before breakfast)      ipratropium (ATROVENT HFA) 17 MCG/ACT inhaler Inhale 2 puffs into the lungs 4 times daily.  therapeutic multivitamin-minerals (THERAGRAN-M) tablet Take 1 tablet by mouth daily.       metFORMIN (GLUCOPHAGE) 500 MG tablet Take 500 mg by mouth 2 times daily (with meals).  docusate sodium (COLACE) 100 MG capsule Take 100 mg by mouth daily.  cloZAPine (CLOZARIL) 25 MG tablet Take 25 mg by mouth every morning       topiramate (TOPAMAX) 50 MG tablet Take 50 mg by mouth 2 times daily       benztropine (COGENTIN) 0.5 MG tablet Take 0.5 mg by mouth daily.  Calcium-Vitamin D-Vitamin K (VIACTIV PO) Take 1 tablet by mouth 3 times daily.  divalproex (DEPAKOTE) 500 MG ER tablet Take 1,000 mg by mouth nightly.  budesonide-formoterol (SYMBICORT) 80-4.5 MCG/ACT AERO Inhale 2 puffs into the lungs 2 times daily. Allergies   Allergen Reactions    Diphenoxylate-Atropine     Lamictal [Lamotrigine]     Pcn [Penicillins]        REVIEW OF SYSTEMS  Review of Systems  10 systems reviewed, pertinent positives perHPI otherwise noted to be negative. PHYSICAL EXAM  /68   Pulse 80   Temp 98.9 °F (37.2 °C)   Resp 14   SpO2 91%     GENERAL APPEARANCE: No acute distress, nontoxic and non-ill-appearing, fully developed well-nourished adult female. HEAD: Normocephalic. Atraumatic. Mild upper and lower lid edema noted on the right eye alone mild erythema around the right external canal with mucopurulent drainage noted. TMs are intact bilaterally,  EYES: EOM's grossly intact. ENT: Mucous membranes are moist.  Posterior pharynx is clear, no adenopathy anterior cervical region  NECK: Supple. Full Range of motion  HEART: RRR. No murmurs  LUNGS:  Lungs are clear to auscultation. ABDOMEN: Soft. Non-distended. Normal bowel sounds. No organomegaly. Non-tender  EXTREMITIES: No peripheral edema. Moves all extremities equally. No gross deformities of the upperor lower extremities. SKIN: Warm and dry. No acute rashes. NEUROLOGICAL: Alert and oriented. Cranial nerves II-12 are grossly intact, no focal neurologic deficits . PSYCHIATRIC: Normal mood and affect.   Physical Exam    LABS  I have reviewed all labs for this visit. No results found for this visit on 10/27/21. EKG  [unfilled]    RADIOLOGY  No results found. PROCEDURES    ED COURSE/MDM  Patient was triaged, vital signs are taken and signs and symptoms consistent with acute otitis externa should be treated for severe symptoms started on Cipro orally and Ciprodex otic  Patient is otherwise tolerating p.o. should be referred to ENT and primary care physician  Strict return precautions provided. The patient is in agreement with this plan. Caretaker is also in agreement with this plan. Old records reviewed. Labs and imaging reviewed and results discussed withpatient. Plan of care discussed with patient and family. Patient and family in agreement with plan. Patient was given scripts for the following medications. I counseled patient how to take these medications. Discharge Medication List as of 10/27/2021 12:31 PM      START taking these medications    Details   ciprofloxacin (CIPRO) 500 MG tablet Take 1 tablet by mouth 2 times daily for 10 days, Disp-20 tablet, R-0Normal      ciprofloxacin-dexamethasone (CIPRODEX) 0.3-0.1 % otic suspension Place 4 drops in ear(s) 2 times daily for 10 days, Disp-7.5 mL, R-0Normal             CLINICALIMPRESSION  1. Facial cellulitis    2. Acute diffuse otitis externa of right ear    3. Swelling of right eyelid        Blood pressure 110/68, pulse 80, temperature 98.9 °F (37.2 °C), resp. rate 14, SpO2 91 %, not currently breastfeeding. DISPOSITION   Neto Leger was discharged to home  in stable condition.                  Ricky Owens, DO  10/27/21 [id number]

## 2021-10-27 NOTE — ED NOTES
Pt d/c home with avs no s.s of distress noted, script x 2 sent in pt denies questions about f/u pt caregiver denies questions      Chiquita Sawant RN  10/27/21 [id number]

## 2021-10-27 NOTE — Clinical Note
Aparna Cason was seen and treated in our emergency department on 10/27/2021. She may return to work on 10/28/2021. If you have any questions or concerns, please don't hesitate to call.       Prem Nguyen, DO

## 2021-10-27 NOTE — Clinical Note
Scottie Cooper was seen and treated in our emergency department on 10/27/2021. She may return to work on 10/28/2021. If you have any questions or concerns, please don't hesitate to call.       Raffaele Díaz, DO

## 2021-11-17 NOTE — PROGRESS NOTES
 Emollient (AQUAPHILIC EX) Apply topically daily      ARTIFICIAL TEAR OP Apply 1 drop to eye 3 times daily      atorvastatin (LIPITOR) 40 MG tablet Take 40 mg by mouth daily      ciclopirox (LOPROX) 0.77 % cream Apply topically 2 times daily Apply topically 2 times daily.-to feet      CLOZAPINE PO Take 50 mg by mouth nightly      folic acid (FOLVITE) 1 MG tablet Take 1 mg by mouth daily      hydrOXYzine (ATARAX) 50 MG tablet Take 50 mg by mouth daily      levothyroxine (SYNTHROID) 125 MCG tablet Take 125 mcg by mouth Daily      lisinopril (PRINIVIL;ZESTRIL) 2.5 MG tablet Take 2.5 mg by mouth daily      METHOTREXATE PO Take 10 mg by mouth once a week mondays      metoprolol tartrate (LOPRESSOR) 50 MG tablet Take 50 mg by mouth 2 times daily      albuterol sulfate  (90 Base) MCG/ACT inhaler Inhale 2 puffs into the lungs every 6 hours as needed for Wheezing or Shortness of Breath 1 Inhaler 5    furosemide (LASIX) 20 MG tablet Take 1 tablet by mouth daily 30 tablet 0    aspirin 81 MG chewable tablet Take 81 mg by mouth daily      loratadine (CLARITIN) 10 MG tablet Take 10 mg by mouth daily      CYCLAFEM 1/35 1-35 MG-MCG per tablet 1 tablet daily      LORazepam (ATIVAN) 0.5 MG tablet 1 tablet every morning (before breakfast)      ipratropium (ATROVENT HFA) 17 MCG/ACT inhaler Inhale 2 puffs into the lungs 4 times daily.  therapeutic multivitamin-minerals (THERAGRAN-M) tablet Take 1 tablet by mouth daily.  metFORMIN (GLUCOPHAGE) 500 MG tablet Take 500 mg by mouth 2 times daily (with meals).  docusate sodium (COLACE) 100 MG capsule Take 100 mg by mouth daily.  cloZAPine (CLOZARIL) 25 MG tablet Take 25 mg by mouth every morning       topiramate (TOPAMAX) 50 MG tablet Take 50 mg by mouth 2 times daily       benztropine (COGENTIN) 0.5 MG tablet Take 0.5 mg by mouth daily.  Calcium-Vitamin D-Vitamin K (VIACTIV PO) Take 1 tablet by mouth 3 times daily.       divalproex (DEPAKOTE) 500 MG ER tablet Take 1,000 mg by mouth nightly.  budesonide-formoterol (SYMBICORT) 80-4.5 MCG/ACT AERO Inhale 2 puffs into the lungs 2 times daily. No current facility-administered medications on file prior to visit. Past Medical History:   Diagnosis Date    Abnormal radiograph 9/28/2015    Asthma     Atrial fibrillation (HCC)     Bipolar 1 disorder (HCC)     Conduct disorder     Conduct disturbance     [facility]     Diabetes mellitus ([facility]     Diabetes mellitus, type 2 ([facility]     Headache     Hypothyroidism     Hypothyroidism     Intellectual disability     Pneumonia     Schizophrenia ([facility]     Scoliosis     Scoliosis 1995    Sleep apnea     CPAP        Past Surgical History:   Procedure Laterality Date    APPENDECTOMY      KNEE ARTHROSCOPY Right 07/01/2016    LUNG SURGERY         Allergies   Allergen Reactions    Diphenoxylate-Atropine     Lamictal [Lamotrigine]     Pcn [Penicillins]        Social History:  Reviewed. reports that she has been smoking cigarettes. She started smoking about 26 years ago. She has a 7.50 pack-year smoking history. She uses smokeless tobacco. She reports current alcohol use. She reports that she does not use drugs. Family History:  Reviewed. family history includes Depression in her father; Diabetes in her paternal grandmother; Emphysema in her paternal grandfather; Heart Failure in her mother; Liver Disease in her maternal aunt. Review of System:    · Constitutional: No fevers, chills. · Eyes: No visual changes or diplopia. No scleral icterus. · ENT: No Headaches. No mouth sores or sore throat. · Cardiovascular: Yes for chest pain, Yes for dyspnea on exertion, Yes for palpitations or No for loss of consciousness. No cough, hemoptysis, No for pleuritic pain, or phlebitis. · Respiratory: No for cough or wheezing. No hematemesis.     · Gastrointestinal: No abdominal pain, blood in 6/4/2020  Summary   Normal LV size & wall thickness;   EF 55-60%. No wall motion abnormalities   Left ventricular cavity size is normal.   Normal left ventricular wall thickness. Ejection fraction is visually estimated to be 60-65%. Mitral valve leaflets appear mildly thickened. The mitral valve is normal in structure and function. Mild mitral regurgitation. Aortic valve leaflets appear thickened. Mild tricuspid regurgitation, RVSP 28 mmHg, RAP 3 mmHg. Stress Test: N/A    Cardiac Angiography: 1/7/2016 [facility]  FINDINGS:   1.  Relatively normal pulmonary capillary wedge pressure at 15 mmHg. The right atrial pressure was slightly elevated at 10 mmHg. 2.  Borderline pulmonary hypertension with a pressure of 43/17 and a mean pulmonary arterial pressure of 29 mmHg. It was difficult for the patient to cooperate with breathing instructions and there was somewhat of a fluctuation in the mean pressures. I do think that the mean pressure of 29 mmHg is accurate. 3.  Normal oxygen saturations in the superior vena cava at 75% and right  atrium at 68%. The pulmonary arterial oxygen saturation was 73%. 4.  Normal cardiac output by thermodilution at 5.79 liters per minute with a cardiac index of 2.46 liters per kilogram per minute. By Staley Mitten the cardiac output is 7.52 liters per minute with a cardiac index of 3.2 liters per kilogram per minute. Pulmonary vascular resistance is 193.      Krysta Vargas MD       Problem List:   Patient Active Problem List    Diagnosis Date Noted    Chronic respiratory failure with hypoxia ([facility] 02/23/2017    FREIDA (obstructive sleep apnea) 02/23/2017    Muscle weakness 10/14/2016    Shortness of breath 08/18/2016    Chondromalacia patellae 06/07/2016    Osteonecrosis of right knee region [facility]) 06/07/2016    Obstructive sleep apnea     Abnormal PFTs 12/17/2015    Hypoxia 12/17/2015    Moderate persistent asthma without complication 93/42/3526   

## 2021-11-23 ENCOUNTER — OFFICE VISIT (OUTPATIENT)
Dept: ENT CLINIC | Age: 41
End: 2021-11-23
Payer: MEDICARE

## 2021-11-23 VITALS
DIASTOLIC BLOOD PRESSURE: 55 MMHG | TEMPERATURE: 97.2 F | HEART RATE: 76 BPM | HEIGHT: 72 IN | BODY MASS INDEX: 33.72 KG/M2 | WEIGHT: 249 LBS | SYSTOLIC BLOOD PRESSURE: 107 MMHG

## 2021-11-23 DIAGNOSIS — H60.11 CELLULITIS OF RIGHT EXTERNAL EAR: Primary | ICD-10-CM

## 2021-11-23 PROCEDURE — 99203 OFFICE O/P NEW LOW 30 MIN: CPT | Performed by: OTOLARYNGOLOGY

## 2021-11-23 PROCEDURE — G8417 CALC BMI ABV UP PARAM F/U: HCPCS | Performed by: OTOLARYNGOLOGY

## 2021-11-23 PROCEDURE — G8484 FLU IMMUNIZE NO ADMIN: HCPCS | Performed by: OTOLARYNGOLOGY

## 2021-11-23 PROCEDURE — 4130F TOPICAL PREP RX AOE: CPT | Performed by: OTOLARYNGOLOGY

## 2021-11-23 PROCEDURE — G8427 DOCREV CUR MEDS BY ELIG CLIN: HCPCS | Performed by: OTOLARYNGOLOGY

## 2021-11-23 NOTE — PROGRESS NOTES
CHIEF COMPLAINT: Cellulitis of the right ear. HISTORY OF PRESENT ILLNESS:  39 y.o. female referred by Dr. Chris Manzano who presents with cellulitis of the right ear involving the ear canal and earlobe and surrounding soft tissue. No otorrhea from the right ear. She was seen in the emergency department 1 month ago and clindamycin was prescribed. She failed to improve and was seen in the emergency department 2 days later where her medications were changed to Cipro 500 mg as well as Ciprodex suspension to the right ear. Her symptoms have since resolved. Today she is symptom-free.     PAST MEDICAL HISTORY:   Social History     Tobacco Use   Smoking Status Current Every Day Smoker    Packs/day: 0.50    Years: 15.00    Pack years: 7.50    Types: Cigarettes    Start date: 1/1/1995   Smokeless Tobacco Current User                                                    Social History     Substance and Sexual Activity   Alcohol Use Yes    Alcohol/week: 0.0 standard drinks                                                    Current Outpatient Medications:     ibuprofen (ADVIL;MOTRIN) 600 MG tablet, Take 1 tablet by mouth 4 times daily as needed for Pain, Disp: 40 tablet, Rfl: 0    acetaminophen (TYLENOL) 325 MG tablet, Take 650 mg by mouth every 4 hours as needed for Pain, Disp: , Rfl:     Emollient (AQUAPHILIC EX), Apply topically daily, Disp: , Rfl:     ARTIFICIAL TEAR OP, Apply 1 drop to eye 3 times daily, Disp: , Rfl:     atorvastatin (LIPITOR) 40 MG tablet, Take 40 mg by mouth daily, Disp: , Rfl:     ciclopirox (LOPROX) 0.77 % cream, Apply topically 2 times daily Apply topically 2 times daily.-to feet, Disp: , Rfl:     CLOZAPINE PO, Take 50 mg by mouth nightly, Disp: , Rfl:     folic acid (FOLVITE) 1 MG tablet, Take 1 mg by mouth daily, Disp: , Rfl:     hydrOXYzine (ATARAX) 50 MG tablet, Take 50 mg by mouth daily, Disp: , Rfl:     levothyroxine (SYNTHROID) 125 MCG tablet, Take 125 mcg by mouth Daily, Disp: , Rfl:     lisinopril (PRINIVIL;ZESTRIL) 2.5 MG tablet, Take 2.5 mg by mouth daily, Disp: , Rfl:     METHOTREXATE PO, Take 10 mg by mouth once a week mondays, Disp: , Rfl:     metoprolol tartrate (LOPRESSOR) 50 MG tablet, Take 50 mg by mouth 2 times daily, Disp: , Rfl:     albuterol sulfate  (90 Base) MCG/ACT inhaler, Inhale 2 puffs into the lungs every 6 hours as needed for Wheezing or Shortness of Breath, Disp: 1 Inhaler, Rfl: 5    furosemide (LASIX) 20 MG tablet, Take 1 tablet by mouth daily, Disp: 30 tablet, Rfl: 0    aspirin 81 MG chewable tablet, Take 81 mg by mouth daily, Disp: , Rfl:     loratadine (CLARITIN) 10 MG tablet, Take 10 mg by mouth daily, Disp: , Rfl:     CYCLAFEM 1/35 1-35 MG-MCG per tablet, 1 tablet daily, Disp: , Rfl:     LORazepam (ATIVAN) 0.5 MG tablet, 1 tablet every morning (before breakfast), Disp: , Rfl:     ipratropium (ATROVENT HFA) 17 MCG/ACT inhaler, Inhale 2 puffs into the lungs 4 times daily. , Disp: , Rfl:     therapeutic multivitamin-minerals (THERAGRAN-M) tablet, Take 1 tablet by mouth daily. , Disp: , Rfl:     metFORMIN (GLUCOPHAGE) 500 MG tablet, Take 500 mg by mouth 2 times daily (with meals). , Disp: , Rfl:     docusate sodium (COLACE) 100 MG capsule, Take 100 mg by mouth daily. , Disp: , Rfl:     cloZAPine (CLOZARIL) 25 MG tablet, Take 25 mg by mouth every morning , Disp: , Rfl:     topiramate (TOPAMAX) 50 MG tablet, Take 50 mg by mouth 2 times daily , Disp: , Rfl:     benztropine (COGENTIN) 0.5 MG tablet, Take 0.5 mg by mouth daily. , Disp: , Rfl:     Calcium-Vitamin D-Vitamin K (VIACTIV PO), Take 1 tablet by mouth 3 times daily. , Disp: , Rfl:     divalproex (DEPAKOTE) 500 MG ER tablet, Take 1,000 mg by mouth nightly., Disp: , Rfl:     budesonide-formoterol (SYMBICORT) 80-4.5 MCG/ACT AERO, Inhale 2 puffs into the lungs 2 times daily. , Disp: , Rfl:                                                  Past Medical History:   Diagnosis Date    Abnormal and eardrops    PLAN: Patient and caregiver reassured. FOLLOW-UP: As needed.

## 2021-11-24 ENCOUNTER — OFFICE VISIT (OUTPATIENT)
Dept: CARDIOLOGY CLINIC | Age: 41
End: 2021-11-24
Payer: MEDICARE

## 2021-11-24 VITALS
DIASTOLIC BLOOD PRESSURE: 72 MMHG | WEIGHT: 250.2 LBS | HEART RATE: 77 BPM | HEIGHT: 72 IN | SYSTOLIC BLOOD PRESSURE: 114 MMHG | BODY MASS INDEX: 33.89 KG/M2

## 2021-11-24 DIAGNOSIS — R00.0 SINUS TACHYCARDIA: ICD-10-CM

## 2021-11-24 DIAGNOSIS — R00.2 PALPITATIONS: ICD-10-CM

## 2021-11-24 DIAGNOSIS — I10 ESSENTIAL HYPERTENSION: ICD-10-CM

## 2021-11-24 DIAGNOSIS — I48.0 PAROXYSMAL ATRIAL FIBRILLATION (HCC): Primary | ICD-10-CM

## 2021-11-24 PROCEDURE — G8427 DOCREV CUR MEDS BY ELIG CLIN: HCPCS | Performed by: NURSE PRACTITIONER

## 2021-11-24 PROCEDURE — G8417 CALC BMI ABV UP PARAM F/U: HCPCS | Performed by: NURSE PRACTITIONER

## 2021-11-24 PROCEDURE — G8484 FLU IMMUNIZE NO ADMIN: HCPCS | Performed by: NURSE PRACTITIONER

## 2021-11-24 PROCEDURE — 99214 OFFICE O/P EST MOD 30 MIN: CPT | Performed by: NURSE PRACTITIONER

## 2021-11-24 PROCEDURE — 4004F PT TOBACCO SCREEN RCVD TLK: CPT | Performed by: NURSE PRACTITIONER

## 2021-11-24 PROCEDURE — 93000 ELECTROCARDIOGRAM COMPLETE: CPT | Performed by: NURSE PRACTITIONER

## 2021-11-24 RX ORDER — FLUOXETINE HYDROCHLORIDE 20 MG/1
CAPSULE ORAL
COMMUNITY
Start: 2021-11-02

## 2021-11-24 RX ORDER — USTEKINUMAB 90 MG/ML
INJECTION, SOLUTION SUBCUTANEOUS
COMMUNITY
Start: 2021-11-15

## 2021-11-24 RX ORDER — METHOTREXATE 2.5 MG/1
TABLET ORAL
COMMUNITY
Start: 2021-11-02 | End: 2021-11-24

## 2021-11-24 RX ORDER — KETOCONAZOLE 20 MG/ML
SHAMPOO TOPICAL
COMMUNITY
Start: 2021-11-02

## 2021-11-24 RX ORDER — LEVOTHYROXINE SODIUM 0.03 MG/1
TABLET ORAL
COMMUNITY
Start: 2021-10-15 | End: 2021-11-24

## 2022-02-24 ENCOUNTER — OFFICE VISIT (OUTPATIENT)
Dept: PULMONOLOGY | Age: 42
End: 2022-02-24
Payer: MEDICARE

## 2022-02-24 VITALS
OXYGEN SATURATION: 93 % | TEMPERATURE: 97.3 F | WEIGHT: 243.6 LBS | BODY MASS INDEX: 33 KG/M2 | HEART RATE: 72 BPM | SYSTOLIC BLOOD PRESSURE: 124 MMHG | DIASTOLIC BLOOD PRESSURE: 70 MMHG | RESPIRATION RATE: 16 BRPM | HEIGHT: 72 IN

## 2022-02-24 DIAGNOSIS — J45.20 MILD INTERMITTENT ASTHMA WITHOUT COMPLICATION: Primary | ICD-10-CM

## 2022-02-24 DIAGNOSIS — Z72.0 TOBACCO ABUSE: ICD-10-CM

## 2022-02-24 DIAGNOSIS — G47.33 OSA (OBSTRUCTIVE SLEEP APNEA): ICD-10-CM

## 2022-02-24 PROCEDURE — G8427 DOCREV CUR MEDS BY ELIG CLIN: HCPCS | Performed by: INTERNAL MEDICINE

## 2022-02-24 PROCEDURE — G8417 CALC BMI ABV UP PARAM F/U: HCPCS | Performed by: INTERNAL MEDICINE

## 2022-02-24 PROCEDURE — G8484 FLU IMMUNIZE NO ADMIN: HCPCS | Performed by: INTERNAL MEDICINE

## 2022-02-24 PROCEDURE — 4004F PT TOBACCO SCREEN RCVD TLK: CPT | Performed by: INTERNAL MEDICINE

## 2022-02-24 PROCEDURE — 99214 OFFICE O/P EST MOD 30 MIN: CPT | Performed by: INTERNAL MEDICINE

## 2022-02-24 RX ORDER — BUDESONIDE AND FORMOTEROL FUMARATE DIHYDRATE 160; 4.5 UG/1; UG/1
2 AEROSOL RESPIRATORY (INHALATION) 2 TIMES DAILY
Qty: 1 EACH | Refills: 11 | Status: SHIPPED | OUTPATIENT
Start: 2022-02-24

## 2022-02-24 ASSESSMENT — ASTHMA QUESTIONNAIRES
ACT_TOTALSCORE: 20
QUESTION_5 LAST FOUR WEEKS HOW WOULD YOU RATE YOUR ASTHMA CONTROL: 4
QUESTION_2 LAST FOUR WEEKS HOW OFTEN HAVE YOU HAD SHORTNESS OF BREATH: 4
QUESTION_3 LAST FOUR WEEKS HOW OFTEN DID YOUR ASTHMA SYMPTOMS (WHEEZING, COUGHING, SHORTNESS OF BREATH, CHEST TIGHTNESS OR PAIN) WAKE YOU UP AT NIGHT OR EARLIER THAN USUAL IN THE MORNING: 5
QUESTION_1 LAST FOUR WEEKS HOW MUCH OF THE TIME DID YOUR ASTHMA KEEP YOU FROM GETTING AS MUCH DONE AT WORK, SCHOOL OR AT HOME: 4
QUESTION_4 LAST FOUR WEEKS HOW OFTEN HAVE YOU USED YOUR RESCUE INHALER OR NEBULIZER MEDICATION (SUCH AS ALBUTEROL): 3

## 2022-02-24 ASSESSMENT — SLEEP AND FATIGUE QUESTIONNAIRES
HOW LIKELY ARE YOU TO NOD OFF OR FALL ASLEEP WHILE SITTING QUIETLY AFTER LUNCH WITHOUT ALCOHOL: 0
HOW LIKELY ARE YOU TO NOD OFF OR FALL ASLEEP WHILE WATCHING TV: 0
HOW LIKELY ARE YOU TO NOD OFF OR FALL ASLEEP WHEN YOU ARE A PASSENGER IN A CAR FOR AN HOUR WITHOUT A BREAK: 0
HOW LIKELY ARE YOU TO NOD OFF OR FALL ASLEEP WHILE SITTING INACTIVE IN A PUBLIC PLACE: 0
HOW LIKELY ARE YOU TO NOD OFF OR FALL ASLEEP WHILE SITTING AND READING: 0
HOW LIKELY ARE YOU TO NOD OFF OR FALL ASLEEP WHILE SITTING AND TALKING TO SOMEONE: 0
HOW LIKELY ARE YOU TO NOD OFF OR FALL ASLEEP WHILE LYING DOWN TO REST IN THE AFTERNOON WHEN CIRCUMSTANCES PERMIT: 0
ESS TOTAL SCORE: 0
HOW LIKELY ARE YOU TO NOD OFF OR FALL ASLEEP IN A CAR, WHILE STOPPED FOR A FEW MINUTES IN TRAFFIC: 0

## 2022-02-24 NOTE — PROGRESS NOTES
Chief complaint  This is a 43y.o. year old female  who comes to see me with a chief complaint of   Chief Complaint   Patient presents with    Asthma    Sleep Apnea    . HPI  Here with cc of Moderate FREIDA with AHI of 26, Asthma (used to see Dr. Micki Nielson)    She did not bring in her chip. Says she uses her machine every night    Breathing is well. Only gets SOB when climbing flights of stairs and carrying groceries. On symbicort and atrovent. Rarely if ever has to use albuterol rescue. Has not been on oxygen for years. She did get her COVID booster. Still smoking 6-8 cigs per day. Lost 7 lbs.         Sleep Medicine 2/24/2022 4/1/2021 1/28/2020 1/3/2019 6/19/2018 12/19/2017 5/25/2017   Sitting and reading 0 0 0 1 1 0 0   Watching TV 0 0 0 1 1 0 0   Sitting, inactive in a public place (e.g. a theatre or a meeting) 0 0 0 1 1 0 0   As a passenger in a car for an hour without a break 0 0 0 1 1 1 0   Lying down to rest in the afternoon when circumstances permit 0 0 0 1 1 0 0   Sitting and talking to someone 0 0 0 1 1 0 0   Sitting quietly after a lunch without alcohol 0 0 0 1 1 0 0   In a car, while stopped for a few minutes in traffic 0 0 0 1 1 0 0   Total score 0 0 0 8 8 1 0   Neck circumference (Inches) - - 0 - - - -       Current Outpatient Medications   Medication Sig Dispense Refill    SYMBICORT 160-4.5 MCG/ACT AERO Inhale 2 puffs into the lungs 2 times daily 1 each 11    ipratropium (ATROVENT HFA) 17 MCG/ACT inhaler Inhale 2 puffs into the lungs 4 times daily 1 each 11    FLUoxetine (PROZAC) 20 MG capsule       ketoconazole (NIZORAL) 2 % shampoo       STELARA 90 MG/ML SOSY prefilled syringe       ibuprofen (ADVIL;MOTRIN) 600 MG tablet Take 1 tablet by mouth 4 times daily as needed for Pain 40 tablet 0    acetaminophen (TYLENOL) 325 MG tablet Take 650 mg by mouth every 4 hours as needed for Pain      Emollient (AQUAPHILIC EX) Apply topically daily      ARTIFICIAL TEAR OP Apply 1 drop to eye 3 times MCG/ACT AERO Inhale 2 puffs into the lungs 2 times daily. No current facility-administered medications for this visit. PHYSICAL EXAM:  GENERAL:  Well nourished, alert, appears stated age, no distress  HEENT:  No scleral icterus, no conjunctival irritation, Mallampati IV, narrow pharynx, with poor dentition, exotropia   NECK:  No thyromegaly, no bruits  LYMPH:  No cervical or supraclavicular adenopathy  HEART:  Regular rate and rhythm, no murmurs. Accentuated S2  LUNGS:  Clear bilaterally   ABDOMEN:  No distention, no organomegaly  EXTREMITIES:  No edema, no digital clubbing  NEURO:  No localizing deficits, CN II-XII intact    PFT 2016  Severe obstruction with bronchodilator response. ECHO 2020  Normal LV size & wall thickness;   EF  55-60%. No wall motion abnormalities   Left ventricular cavity size is normal.   Normal left ventricular wall thickness. Ejection fraction is visually estimated to be 60-65%. Mitral valve leaflets appear mildly thickened. The mitral valve is normal in structure and function. Mild mitral regurgitation. Aortic valve leaflets appear thickened. Mild tricuspid regurgitation, RVSP 28 mmHg, RAP 3 mmHg. ASTHMA CONTROL TEST 2/24/2022 8/19/2021 1/12/2021 7/28/2020 1/16/2020 7/31/2019 4/16/2019   In the past 4 weeks, how much of the time did your asthma keep you from getting as much done at work, school or at home? 4 5 5 4 5 4 4   During the past 4 weeks, how often have you had shortness of breath? 4 4 5 4 4 4 4   During the past 4 weeks, how often did your asthma symptoms (wheezing, coughing, shortness of breath, chest tightness or pain) wake you up at night or earlier than usual in the morning? 5 4 5 4 5 5 4   During the past 4 weeks, how often have you used your rescue inhaler or nebulizer medication (such as albuterol)? 3 5 5 3 5 3 4   How would you rate your asthma control during the past 4 weeks?  4 4 5 4 4 4 5   Asthma Control Test Total Score 20 22 25 19 23 20 21       Assessment/Plan:  1. Mild intermittent asthma without complication  Asthma score at goal.  Would try to weak inhalers once she quits tobacco.  Otherwise continue as is.    - SYMBICORT 160-4.5 MCG/ACT AERO; Inhale 2 puffs into the lungs 2 times daily  Dispense: 1 each; Refill: 11  - ipratropium (ATROVENT HFA) 17 MCG/ACT inhaler; Inhale 2 puffs into the lungs 4 times daily  Dispense: 1 each; Refill: 11    2. FREIDA (obstructive sleep apnea)  Needs to bring in chip at next visit. Will order mask and supplies at her request    3. Tobacco abuse  6-8 cigs per day. Only allowed to smoke outside every 2 hours (lives in group home). Likely will be hard to get her to quit. Needs to just be made aware of harmful effects smoking has.       Follow up in 6 months

## 2022-02-24 NOTE — PATIENT INSTRUCTIONS
Continue with inhalers    Send order to [facility] for mask and supplies    Need to quit tobacco     Follow up in 6 months with chip from machine

## 2022-04-20 ENCOUNTER — HOSPITAL ENCOUNTER (EMERGENCY)
Age: 42
Discharge: HOME | End: 2022-04-20
Payer: MEDICARE

## 2022-04-20 ENCOUNTER — APPOINTMENT (OUTPATIENT)
Dept: GENERAL RADIOLOGY | Age: 42
End: 2022-04-20
Payer: MEDICARE

## 2022-04-20 VITALS
OXYGEN SATURATION: 93 % | TEMPERATURE: 98.3 F | HEART RATE: 72 BPM | DIASTOLIC BLOOD PRESSURE: 74 MMHG | RESPIRATION RATE: 20 BRPM | SYSTOLIC BLOOD PRESSURE: 140 MMHG | HEIGHT: 72 IN | BODY MASS INDEX: 32.55 KG/M2 | WEIGHT: 240.3 LBS

## 2022-04-20 DIAGNOSIS — S90.111A CONTUSION OF RIGHT GREAT TOE WITHOUT DAMAGE TO NAIL, INITIAL ENCOUNTER: Primary | ICD-10-CM

## 2022-04-20 PROCEDURE — 73660 X-RAY EXAM OF TOE(S): CPT

## 2022-04-20 PROCEDURE — 99283 EMERGENCY DEPT VISIT LOW MDM: CPT

## 2022-04-20 NOTE — ED PROVIDER NOTES
[address] [address] [address] [facility] [address]  Dept: [phone]  Loc: [address] ENCOUNTER        This patient was not seen or evaluated by the attending physician. I evaluated this patient, the attending physician was available for consultation. CHIEF COMPLAINT    Chief Complaint   Patient presents with    Toe Injury     R 1st toe hit by lawn furniture 3 days ago. TREMAINE Ta is a 43 y.o. female who presents with right great toe pain. The onset was Sunday. The duration has been constant since the onset. The patient has no other associated injury. The context is that she was celebrating Easter with her family on Sunday when a large glass table leg came down onto her great toe. The patient does have some diabetes, so has some diminished sensation to her feet, while she has some discomfort in the area, with the amount of bruising that she sees she is concerned she may not be feeling a fracture, so comes in today for evaluation. REVIEW OF SYSTEMS    Skin: No lacerations or puncture wounds  Musculoskeletal: see HPI, no other joint or bony injury or pain  Neurologic: No loss of consciousness, no head injury, no paresthesias or focal distal extremity weakness  All other systems reviewed and are negative.     PAST MEDICAL & SURGICAL HISTORY    Past Medical History:   Diagnosis Date    Abnormal radiograph 9/28/2015    Asthma     Atrial fibrillation (HCC)     Bipolar 1 disorder (HCC)     Conduct disorder     Conduct disturbance     COPD [facility]chronic obstructive [facility] disease) [facility])     Diabetes mellitus ([address]     Diabetes mellitus, type 2 ([address]     Headache     Hypothyroidism     Hypothyroidism     Intellectual disability     Pneumonia     [facility]     Scoliosis     Scoliosis 1995    Sleep apnea     CPAP     Past Surgical History:   Procedure Laterality Date    APPENDECTOMY      KNEE ARTHROSCOPY Right 07/01/2016    LUNG SURGERY         CURRENT MEDICATIONS  (may include discharge medications prescribed in the ED)  Current Outpatient Rx   Medication Sig Dispense Refill    SYMBICORT 160-4.5 MCG/ACT AERO Inhale 2 puffs into the lungs 2 times daily 1 each 11    ipratropium (ATROVENT HFA) 17 MCG/ACT inhaler Inhale 2 puffs into the lungs 4 times daily 1 each 11    FLUoxetine (PROZAC) 20 MG capsule       ketoconazole (NIZORAL) 2 % shampoo       STELARA 90 MG/ML SOSY prefilled syringe       ibuprofen (ADVIL;MOTRIN) 600 MG tablet Take 1 tablet by mouth 4 times daily as needed for Pain 40 tablet 0    acetaminophen (TYLENOL) 325 MG tablet Take 650 mg by mouth every 4 hours as needed for Pain      Emollient (AQUAPHILIC EX) Apply topically daily      ARTIFICIAL TEAR OP Apply 1 drop to eye 3 times daily      atorvastatin (LIPITOR) 40 MG tablet Take 40 mg by mouth daily      ciclopirox (LOPROX) 0.77 % cream Apply topically 2 times daily Apply topically 2 times daily.-to feet      CLOZAPINE PO Take 50 mg by mouth nightly      folic acid (FOLVITE) 1 MG tablet Take 1 mg by mouth daily      hydrOXYzine (ATARAX) 50 MG tablet Take 50 mg by mouth daily      levothyroxine (SYNTHROID) 125 MCG tablet Take 125 mcg by mouth Daily      lisinopril (PRINIVIL;ZESTRIL) 2.5 MG tablet Take 2.5 mg by mouth daily      METHOTREXATE PO Take 10 mg by mouth once a week mondays      metoprolol tartrate (LOPRESSOR) 50 MG tablet Take 50 mg by mouth 2 times daily      albuterol sulfate  (90 Base) MCG/ACT inhaler Inhale 2 puffs into the lungs every 6 hours as needed for Wheezing or Shortness of Breath 1 Inhaler 5    furosemide (LASIX) 20 MG tablet Take 1 tablet by mouth daily 30 tablet 0    aspirin 81 MG chewable tablet Take 81 mg by mouth daily      loratadine (CLARITIN) 10 MG tablet Take 10 mg by mouth daily      CYCLAFEM 1/35 1-35 MG-MCG per tablet 1 tablet daily      LORazepam (ATIVAN) 0.5 MG tablet 1 tablet every morning (before breakfast)      ipratropium (ATROVENT HFA) 17 MCG/ACT inhaler Inhale 2 puffs into the lungs 4 times daily.  therapeutic multivitamin-minerals (THERAGRAN-M) tablet Take 1 tablet by mouth daily.  metFORMIN (GLUCOPHAGE) 500 MG tablet Take 500 mg by mouth 2 times daily (with meals).  docusate sodium (COLACE) 100 MG capsule Take 100 mg by mouth daily.  cloZAPine (CLOZARIL) 25 MG tablet Take 25 mg by mouth every morning       topiramate (TOPAMAX) 50 MG tablet Take 50 mg by mouth 2 times daily       benztropine (COGENTIN) 0.5 MG tablet Take 0.5 mg by mouth daily.  Calcium-Vitamin D-Vitamin K (VIACTIV PO) Take 1 tablet by mouth 3 times daily.  divalproex (DEPAKOTE) 500 MG ER tablet Take 1,000 mg by mouth nightly.  budesonide-formoterol (SYMBICORT) 80-4.5 MCG/ACT AERO Inhale 2 puffs into the lungs 2 times daily. ALLERGIES    Allergies   Allergen Reactions    Diphenoxylate-Atropine     Lamictal [Lamotrigine]     Pcn [Penicillins]        SOCIAL & FAMILY HISTORY    Social History     Socioeconomic History    Marital status: Single     Spouse name: Not on file    Number of children: Not on file    Years of education: Not on file    Highest education level: Not on file   Occupational History    Not on file   Tobacco Use    Smoking status: Current Every Day Smoker     Packs/day: 0.50     Years: 15.00     Pack years: 7.50     Types: Cigarettes     Start date: 1/1/1995    Smokeless tobacco: Current User   Vaping Use    Vaping Use: Former   Substance and Sexual Activity    Alcohol use:  Yes     Alcohol/week: 0.0 standard drinks    Drug use: No    Sexual activity: Not Currently   Other Topics Concern    Not on file   Social History Narrative    Not on file     Social Determinants of Health     Financial Resource Strain:     Difficulty of Paying Living Expenses: Not on file   Food Insecurity:     Worried About Running Out of Food in the Last Year: Not on file    Ran Out of Food in the Last Year: Not on file   Transportation Needs:     Lack of Transportation (Medical): Not on file    Lack of Transportation (Non-Medical):  Not on file   Physical Activity:     Days of Exercise per Week: Not on file    Minutes of Exercise per Session: Not on file   Stress:     Feeling of Stress : Not on file   Social Connections:     Frequency of Communication with Friends and Family: Not on file    Frequency of Social Gatherings with Friends and Family: Not on file    Attends Synagogue Services: Not on file    Active Member of [address] [place] [address] [employer] or Organizations: Not on file    Attends Club or Organization Meetings: Not on file    Marital Status: Not on file   Intimate Partner Violence:     Fear of Current or Ex-Partner: Not on file    Emotionally Abused: Not on file    Physically Abused: Not on file    Sexually Abused: Not on file   Housing Stability:     Unable to Pay for Housing in the Last Year: Not on file    Number of [place] in the Last Year: Not on file    Unstable Housing in the Last Year: Not on file     Family History   Problem Relation Age of Onset    Heart Failure Mother     Depression Father     Emphysema Paternal Grandfather     Liver Disease Maternal Aunt     Diabetes Paternal Grandmother          PHYSICAL EXAM    VITAL SIGNS: BP (!) 140/74   Pulse 72   Temp 98.3 °F (36.8 °C) (Oral)   Resp 20   Ht 6' (1.829 m)   Wt 240 lb 4.8 oz (109 kg)   SpO2 93%   BMI 32.59 kg/m²   Constitutional:  Well nourished, no acute distress  HENT:  Atraumatic, moist mucous membranes  NECK: normal range of motion,  supple   Respiratory:  No respiratory distress  Cardiovascular:  No JVD  Vascular: right posterior tibialis and dorsalis pedis pulse 2+, capillary refill less than 2 seconds  Musculoskeletal:  + mild tenderness to palpation of the great toe on the right foot, no edema, no deformity but with bruising noted on dorsal aspect

## 2022-04-20 NOTE — Clinical Note
Renea Whittington was seen and treated in our emergency department on 4/20/2022. She may return to work on 04/25/2022. If you have any questions or concerns, please don't hesitate to call.       [place], [place]

## 2022-06-08 NOTE — PROGRESS NOTES
edema. Home medications:   Current Outpatient Medications on File Prior to Visit   Medication Sig Dispense Refill    SYMBICORT 160-4.5 MCG/ACT AERO Inhale 2 puffs into the lungs 2 times daily 1 each 11    ipratropium (ATROVENT HFA) 17 MCG/ACT inhaler Inhale 2 puffs into the lungs 4 times daily 1 each 11    FLUoxetine (PROZAC) 20 MG capsule       ketoconazole (NIZORAL) 2 % shampoo       STELARA 90 MG/ML SOSY prefilled syringe       ibuprofen (ADVIL;MOTRIN) 600 MG tablet Take 1 tablet by mouth 4 times daily as needed for Pain 40 tablet 0    acetaminophen (TYLENOL) 325 MG tablet Take 650 mg by mouth every 4 hours as needed for Pain      Emollient (AQUAPHILIC EX) Apply topically daily      ARTIFICIAL TEAR OP Apply 1 drop to eye 3 times daily      ciclopirox (LOPROX) 0.77 % cream Apply topically 2 times daily Apply topically 2 times daily.-to feet      CLOZAPINE PO Take 50 mg by mouth nightly      hydrOXYzine (ATARAX) 50 MG tablet Take 50 mg by mouth daily      levothyroxine (SYNTHROID) 125 MCG tablet Take 125 mcg by mouth Daily      METHOTREXATE PO Take 10 mg by mouth once a week mondays      albuterol sulfate  (90 Base) MCG/ACT inhaler Inhale 2 puffs into the lungs every 6 hours as needed for Wheezing or Shortness of Breath 1 Inhaler 5    loratadine (CLARITIN) 10 MG tablet Take 10 mg by mouth daily      LORazepam (ATIVAN) 0.5 MG tablet 1 tablet every morning (before breakfast)      ipratropium (ATROVENT HFA) 17 MCG/ACT inhaler Inhale 2 puffs into the lungs 4 times daily.  therapeutic multivitamin-minerals (THERAGRAN-M) tablet Take 1 tablet by mouth daily.  metFORMIN (GLUCOPHAGE) 500 MG tablet Take 500 mg by mouth 2 times daily (with meals).  docusate sodium (COLACE) 100 MG capsule Take 100 mg by mouth daily.       cloZAPine (CLOZARIL) 25 MG tablet Take 25 mg by mouth every morning       topiramate (TOPAMAX) 50 MG tablet Take 50 mg by mouth 2 times daily       benztropine (COGENTIN) 0.5 MG tablet Take 0.5 mg by mouth daily.  Calcium-Vitamin D-Vitamin K (VIACTIV PO) Take 1 tablet by mouth 3 times daily.  divalproex (DEPAKOTE) 500 MG ER tablet Take 1,000 mg by mouth nightly.  budesonide-formoterol (SYMBICORT) 80-4.5 MCG/ACT AERO Inhale 2 puffs into the lungs 2 times daily.  atorvastatin (LIPITOR) 40 MG tablet Take 40 mg by mouth daily (Patient not taking: Reported on 1/21/4086)      folic acid (FOLVITE) 1 MG tablet Take 1 mg by mouth daily (Patient not taking: Reported on 6/22/2022)       No current facility-administered medications on file prior to visit. Past Medical History:   Diagnosis Date    Abnormal radiograph 9/28/2015    Asthma     [facility]     Bipolar 1 disorder ([facility])     Conduct disorder     Conduct disturbance     COPD [facility]chronic obstructive pulmonary disease) ([facility])     Diabetes [facility]     Diabetes mellitus, type 2 ([facility]     Headache     Hypothyroidism     Hypothyroidism     Intellectual disability     Pneumonia     Sch[facility]ia [facility]     Scoliosis     Scoliosis 1995    Sleep apnea     CPAP        Past Surgical History:   Procedure Laterality Date    APPENDECTOMY      KNEE ARTHROSCOPY Right 07/01/2016    LUNG SURGERY         Allergies   Allergen Reactions    Diphenoxylate-Atropine     Lamictal [Lamotrigine]     Pcn [Penicillins]        Social History:  Reviewed. reports that she has been smoking cigarettes. She started smoking about 27 years ago. She has a 7.50 pack-year smoking history. She uses smokeless tobacco. She reports current alcohol use. She reports that she does not use drugs. Family History:  Reviewed. family history includes Depression in her father; Diabetes in her paternal grandmother; Emphysema in her paternal grandfather; Heart Failure in her mother; Liver Disease in her maternal aunt. Review of System:    · Constitutional: No fevers, chills.    · Eyes: No visual changes or diplopia. No scleral icterus. · ENT: No Headaches. No mouth sores or sore throat. · Cardiovascular: Yes for chest pain, Yes for dyspnea on exertion, Yes for palpitations or No for loss of consciousness. No cough, hemoptysis, No for pleuritic pain, or phlebitis. · Respiratory: No for cough or wheezing. No hematemesis. · Gastrointestinal: No abdominal pain, blood in stools. · Genitourinary: No dysuria, or hematuria. · Musculoskeletal: No gait disturbance,    · Integumentary: No rash or pruritis. · Neurological: No headache, change in muscle strength, numbness or tingling. · Psychiatric: No anxiety, or depression. · Endocrine: No temperature intolerance. No excessive thirst, fluid intake, or urination. · Hem/Lymph: No abnormal bruising or bleeding, blood clots or swollen lymph nodes. · Allergic/Immunologic: No nasal congestion or hives. Physical Examination:  Vitals:    06/22/22 1047   BP: 120/70   Pulse: 84         Wt Readings from Last 3 Encounters:   06/22/22 248 lb (112.5 kg)   04/20/22 240 lb 4.8 oz (109 kg)   02/24/22 243 lb 9.6 oz (110.5 kg)       · Constitutional: Oriented. No distress. · Head: Normocephalic and atraumatic. · Mouth/Throat: Oropharynx is clear and moist.   · Eyes: Conjunctivae clear without jaunduice. PERRL. · Neck: Neck supple. No rigidity. No JVD present. · Cardiovascular: Normal rate, regular rhythm, S1&S2. · Pulmonary/Chest: Bilateral respiratory sounds. No wheezes, No rhonchi. · Abdominal: Soft. Bowel sounds present. No distension, No tenderness. · Musculoskeletal: No tenderness. No edema    · Lymphadenopathy: Has no cervical adenopathy. · Neurological: Alert and oriented. Cranial nerve appears intact, No Gross deficit   · Skin: Skin is warm and dry. No rash noted. · Psychiatric: Has a normal mood, affect and behavior     Labs:  Reviewed. No results for input(s): NA, K, CL, CO2, PHOS, BUN, CREATININE, CA in the last 72 hours.     Invalid input(s): TSH  No results for input(s): WBC, HGB, HCT, MCV, PLT in the last 72 hours. Lab Results   Component Value Date    TROPONINI 0.00 10/11/2013     No results found for: BNP  Lab Results   Component Value Date    PROTIME 10.7 09/24/2015    INR 0.99 09/24/2015     No results found for: CHOL, HDL, TRIG    ECG: Personally reviewed: NSR, HR 84, , QRS 92, QTc 416    ECHO: 6/4/2020  Summary   Normal LV size & wall thickness;   EF 55-60%. No wall motion abnormalities   Left ventricular cavity size is normal.   Normal left ventricular wall thickness. Ejection fraction is visually estimated to be 60-65%. Mitral valve leaflets appear mildly thickened. The mitral valve is normal in structure and function. Mild mitral regurgitation. Aortic valve leaflets appear thickened. Mild tricuspid regurgitation, RVSP 28 mmHg, RAP 3 mmHg. Stress Test: N/A    Cardiac Angiography: 1/7/2016 [facility]  FINDINGS:   1.  Relatively normal pulmonary capillary wedge pressure at 15 mmHg. The right atrial pressure was slightly elevated at 10 mmHg. 2.  Borderline pulmonary hypertension with a pressure of 43/17 and a mean pulmonary arterial pressure of 29 mmHg. It was difficult for the patient to cooperate with breathing instructions and there was somewhat of a fluctuation in the mean pressures. I do think that the mean pressure of 29 mmHg is accurate. 3.  Normal oxygen saturations in the superior vena cava at 75% and right  atrium at 68%. The pulmonary arterial oxygen saturation was 73%. 4.  Normal cardiac output by thermodilution at 5.79 liters per minute with a cardiac index of 2.46 liters per kilogram per minute. By Joselo Shanks the cardiac output is 7.52 liters per minute with a cardiac index of 3.2 liters per kilogram per minute. Pulmonary vascular resistance is 193.      Toñito Hernandez MD       Problem List:   Patient Active Problem List    Diagnosis Date Noted    Chronic respiratory failure with hypoxia ([address] Patient received education regarding their diagnosis, treatment and medications while in the office today. Shyam Styles CNP  [facility]         I  have spent 30 minutes in care of the patient including direct face to face time, chart preparation, reviewing diagnostic testing, other provider notes and coordinating patient care.

## 2022-06-22 ENCOUNTER — OFFICE VISIT (OUTPATIENT)
Dept: CARDIOLOGY CLINIC | Age: 42
End: 2022-06-22
Payer: MEDICARE

## 2022-06-22 VITALS
SYSTOLIC BLOOD PRESSURE: 120 MMHG | WEIGHT: 248 LBS | BODY MASS INDEX: 33.63 KG/M2 | HEART RATE: 84 BPM | DIASTOLIC BLOOD PRESSURE: 70 MMHG

## 2022-06-22 DIAGNOSIS — I10 BENIGN ESSENTIAL HTN: ICD-10-CM

## 2022-06-22 DIAGNOSIS — R00.2 PALPITATIONS: ICD-10-CM

## 2022-06-22 DIAGNOSIS — I48.0 PAROXYSMAL ATRIAL FIBRILLATION (HCC): Primary | ICD-10-CM

## 2022-06-22 DIAGNOSIS — I47.1 ATRIAL TACHYCARDIA (HCC): ICD-10-CM

## 2022-06-22 PROCEDURE — 99214 OFFICE O/P EST MOD 30 MIN: CPT | Performed by: NURSE PRACTITIONER

## 2022-06-22 PROCEDURE — G8417 CALC BMI ABV UP PARAM F/U: HCPCS | Performed by: NURSE PRACTITIONER

## 2022-06-22 PROCEDURE — 93000 ELECTROCARDIOGRAM COMPLETE: CPT | Performed by: NURSE PRACTITIONER

## 2022-06-22 PROCEDURE — G8427 DOCREV CUR MEDS BY ELIG CLIN: HCPCS | Performed by: NURSE PRACTITIONER

## 2022-06-22 PROCEDURE — 4004F PT TOBACCO SCREEN RCVD TLK: CPT | Performed by: NURSE PRACTITIONER

## 2022-06-22 RX ORDER — LISINOPRIL 2.5 MG/1
2.5 TABLET ORAL DAILY
Qty: 90 TABLET | Refills: 3 | Status: SHIPPED | OUTPATIENT
Start: 2022-06-22

## 2022-06-22 RX ORDER — METOPROLOL TARTRATE 50 MG/1
50 TABLET, FILM COATED ORAL 2 TIMES DAILY
Qty: 180 TABLET | Refills: 3 | Status: SHIPPED | OUTPATIENT
Start: 2022-06-22

## 2022-06-22 NOTE — PATIENT INSTRUCTIONS
20 Tips For Changing the Way You Eat    1. If you think you are hungry, drink a glass of water or a cup of coffee before eating. 2. Eat s-l-o-w-l-y! It takes your brain 20 minutes to catch up to your stomach and by then most of us have overeaten. 3. Eat for 10 minutes and rest for 5 minutes. If you are still hungry, start over. 4. Eat to live not live to eat! You control food, it does not control you! 5. Your stomach is the size of your fist. If you eat more than that, you have overeaten. 6. Eat when you are hungry and not because it is a scheduled time to eat. 7. If you are hungry and it is not time yet to eat your meal, eat a small snack (such as 2 peanut butter crackers, 1 tsp of peanut butter, 15 peanuts, small apple or a cup of light popcorn). Make sure you take 10 minutes to eat this snack so it will hold you over to your next meal.    8. Eat your fruit first as it will help breakdown you meal.    9. If given a lot of options at a meal - pick only 3. People who put more than one item on their plate tend to eat more. 10. Eat the one item you want the most - first! This will help fill you up more and control your hunger. 11. Get moving! Get a pedometer and try to get in as many steps as you can. 2000 steps = 1 mile. Make 10,000 steps a day your goal.    12. We are the only country that practices eating to prevent getting hungry. 13. Identify if you are an emotional eater - you want something salty or sweet instead of something healthy or you want to eat when you are not hungry. 14. Sugar contributes nothing in the way of nutrients. 15. Measure your level of hunger. The hungrier you are, the more fat you burn when you eat. 16. If you eat dessert more than 30 minutes after a meal, it turns to fat. 17. The calories in alcohol are used before stored fat calories. 18. It is ok to miss a meal if you are not hungry.     19. If snacks are not around the house, then they wont tempt you. Make better choices at the store for those times when you are really hungry. 20. Remember that food will only make you feel better when you are really hungry not when you are sad, mad, stressed or depressed. Patient Education        Learning About Low-Carbohydrate Foods  What foods are low in carbohydrate? The foods you eat contain nutrients, such as vitamins and minerals. Carbohydrate is a nutrient. Your body needs the right amount to stay healthy and work as it should. You can use the list below to help you make choicesabout which foods to eat. Some foods that are lower in carbohydrate include:  Dairy and dairy alternatives   Cheese   Cottage cheese   Cream cheese   Nut milk (unsweetened)   Soy milk (unsweetened)   Yogurt ([place], plain)  Fruits   Avocado   [employer] and other protein foods   Almonds   Beef   Chicken   Cod   Eggs   Halibut   Peanut butter and other nut butters   Pistachios   Pork   Pumpkin seeds   Tofu   Trout   [place]   [place] [facility]   Walnuts  Vegetables   Broccoli   Carrots   Cauliflower   Green beans   Mushrooms   Peppers   Salad greens   Spinach   Tomatoes  Work with your doctor to find out how much of this nutrient you need. Dependingon your health, you may need more or less of it in your diet. Where can you learn more? Go to https://CoursePeerpeYETI Group.Kabooza. org and sign in to your [employer] account. Enter 67 [id number] in the [facility] box to learn more about \"Learning About Low-Carbohydrate Foods. \"     If you do not have an account, please click on the \"Sign Up Now\" link. Current as of: September 8, 2021               Content Version: 13.3  © 3430-5997 Healthwise, Incorporated. Care instructions adapted under license by [facility] ([facility]).  If you have questions about a medical condition or this instruction, always ask your healthcare professional. Loraine Chacon any warranty or liability for your use of this information. Patient Education        Learning About Low-Carbohydrate Diets  What is a low-carbohydrate diet? A low-carbohydrate (or \"low-carb\") diet limits foods and drinks that have carbohydrates. This includes grains, fruits, milk and yogurt, and starchy vegetables like potatoes, beans, and corn. It also avoids foods and drinks that have added sugar. Instead, low-carb diets include foods that are high inprotein and fat. Why might you follow a low-carb diet? Low-carb diets may be used for a variety of reasons, such as for weight loss. People who have diabetes may use a low-carb diet to help manage their bloodsugar levels. What should you do before you start the diet? Talk to your doctor before you try any diet. This is even more important if you have health problems like kidney disease, heart disease, or diabetes. Your doctor may suggest that you meet with a registered dietitian. A dietitian canhelp you make an eating plan that works for you. What foods do you eat on a low-carb diet? On a low-carb diet, you choose foods that are high in protein and fat. Examplesof these are:  [employer], poultry, and fish.  Eggs.  Nuts, such as walnuts, pecans, almonds, and peanuts.  Peanut butter and other nut butters.  Tofu.  Avocado.  Olives.  Non-starchy vegetables like broccoli, cauliflower, green beans, mushrooms, peppers, lettuce, and spinach.  Unsweetened non-dairy milks like almond milk and coconut milk.  Cheese, cottage cheese, and cream cheese. Where can you learn more? Go to https://QuantiSenseelio.COCC. org and sign in to your big[employer]incan account. Enter C335 in the [employer] box to learn more about \"Learning About Low-Carbohydrate Diets. \"     If you do not have an account, please click on the \"Sign Up Now\" link. Current as of: September 8, 2021               Content Version: 13.3  © 2821-5753 Healthwise, Incorporated.    Care instructions adapted under license by [facility] ([facility]). If you have questions about a medical condition or this instruction, always ask your healthcare professional. [address] [address] any warranty or liability for your use of this information.

## 2023-02-02 DIAGNOSIS — J45.20 MILD INTERMITTENT ASTHMA WITHOUT COMPLICATION: ICD-10-CM

## 2023-02-02 RX ORDER — BUDESONIDE AND FORMOTEROL FUMARATE DIHYDRATE 160; 4.5 UG/1; UG/1
AEROSOL RESPIRATORY (INHALATION)
Qty: 10.2 G | Refills: 11 | Status: SHIPPED | OUTPATIENT
Start: 2023-02-02

## 2023-02-02 NOTE — TELEPHONE ENCOUNTER
Last appt: 2-  Next appt: No future appointment is scheduled at this time.    Medication matches medication on Epic list

## 2023-02-21 RX ORDER — IPRATROPIUM BROMIDE 17 UG/1
AEROSOL, METERED RESPIRATORY (INHALATION)
Qty: 12.9 G | Refills: 11 | Status: SHIPPED | OUTPATIENT
Start: 2023-02-21

## 2023-03-28 ENCOUNTER — HOSPITAL ENCOUNTER (EMERGENCY)
Age: 43
Discharge: HOME | End: 2023-03-28
Attending: EMERGENCY MEDICINE
Payer: MEDICARE

## 2023-03-28 VITALS
RESPIRATION RATE: 18 BRPM | OXYGEN SATURATION: 95 % | DIASTOLIC BLOOD PRESSURE: 70 MMHG | TEMPERATURE: 98.2 F | HEART RATE: 93 BPM | SYSTOLIC BLOOD PRESSURE: 111 MMHG

## 2023-03-28 DIAGNOSIS — H60.501 ACUTE OTITIS EXTERNA OF RIGHT EAR, UNSPECIFIED TYPE: Primary | ICD-10-CM

## 2023-03-28 PROCEDURE — 99283 EMERGENCY DEPT VISIT LOW MDM: CPT

## 2023-03-28 PROCEDURE — 6370000000 HC RX 637 (ALT 250 FOR IP): Performed by: EMERGENCY MEDICINE

## 2023-03-28 RX ORDER — IBUPROFEN 600 MG/1
600 TABLET ORAL ONCE
Status: COMPLETED | OUTPATIENT
Start: 2023-03-28 | End: 2023-03-28

## 2023-03-28 RX ORDER — CIPROFLOXACIN 500 MG/1
500 TABLET, FILM COATED ORAL 2 TIMES DAILY
Qty: 20 TABLET | Refills: 0 | Status: SHIPPED | OUTPATIENT
Start: 2023-03-28 | End: 2023-04-07

## 2023-03-28 RX ORDER — CIPROFLOXACIN AND DEXAMETHASONE 3; 1 MG/ML; MG/ML
4 SUSPENSION/ DROPS AURICULAR (OTIC) 2 TIMES DAILY
Qty: 7.5 ML | Refills: 0 | Status: SHIPPED | OUTPATIENT
Start: 2023-03-28 | End: 2023-04-04

## 2023-03-28 RX ADMIN — IBUPROFEN 600 MG: 600 TABLET, FILM COATED ORAL at 10:00

## 2023-03-28 ASSESSMENT — PAIN - FUNCTIONAL ASSESSMENT
PAIN_FUNCTIONAL_ASSESSMENT: PREVENTS OR INTERFERES SOME ACTIVE ACTIVITIES AND ADLS
PAIN_FUNCTIONAL_ASSESSMENT: 0-10

## 2023-03-28 ASSESSMENT — PAIN DESCRIPTION - PAIN TYPE: TYPE: ACUTE PAIN

## 2023-03-28 ASSESSMENT — PAIN DESCRIPTION - ONSET: ONSET: GRADUAL

## 2023-03-28 ASSESSMENT — PAIN DESCRIPTION - ORIENTATION: ORIENTATION: RIGHT

## 2023-03-28 ASSESSMENT — PAIN SCALES - GENERAL
PAINLEVEL_OUTOF10: 7
PAINLEVEL_OUTOF10: 7
PAINLEVEL_OUTOF10: 6

## 2023-03-28 ASSESSMENT — PAIN DESCRIPTION - DESCRIPTORS: DESCRIPTORS: ACHING

## 2023-03-28 ASSESSMENT — PAIN DESCRIPTION - LOCATION: LOCATION: EAR

## 2023-03-28 ASSESSMENT — PAIN DESCRIPTION - FREQUENCY: FREQUENCY: CONTINUOUS

## 2023-03-28 NOTE — ED PROVIDER NOTES
[address] [facility] ENCOUNTER        Pt Name: Estuardo Wilson  MRN: [id number]  [place] 1980  Date of evaluation: 3/28/2023  Provider: Natacha Taveras MD  PCP: No primary care provider on file. Note Started: 9:49 AM EDT 3/28/23    CHIEF COMPLAINT       Chief Complaint   Patient presents with    Otalgia     Right ear pain and swelling; visible redness       HISTORY OF PRESENT ILLNESS: 1 or more Elements     History from : Patient and Caregiver    Limitations to history : None    Estuardo Wilson is a 37 y.o. female who presents to the emergency department with right ear pain and swelling and cellulitis. Patient noticed this few days ago. She is diabetic. She states that this is happened to her before and she was started on antibiotics that cleared it up. She denies fever, chills, difficulty breathing or swallowing, neck pain or stiffness    Nursing Notes were all reviewed and agreed with or any disagreements were addressed in the HPI. REVIEW OF SYSTEMS :      Review of Systems    10 systems reviewed and negative except as in HPI/MDM    SURGICAL HISTORY     Past Surgical History:   Procedure Laterality Date    APPENDECTOMY      KNEE ARTHROSCOPY Right 07/01/2016    LUNG SURGERY         CURRENTMEDICATIONS       Previous Medications    ACETAMINOPHEN (TYLENOL) 325 MG TABLET    Take 650 mg by mouth every 4 hours as needed for Pain    ALBUTEROL SULFATE  (90 BASE) MCG/ACT INHALER    Inhale 2 puffs into the lungs every 6 hours as needed for Wheezing or Shortness of Breath    ARTIFICIAL TEAR OP    Apply 1 drop to eye 3 times daily    ATORVASTATIN (LIPITOR) 40 MG TABLET    Take 40 mg by mouth daily    ATROVENT HFA 17 MCG/ACT INHALER    SHAKE WELL AND INHALE TWO PUFFS BY MOUTH FOUR TIMES A DAY FOR ASTHMA. BENZTROPINE (COGENTIN) 0.5 MG TABLET    Take 0.5 mg by mouth daily.     BUDESONIDE-FORMOTEROL (SYMBICORT) 80-4.5 MCG/ACT AERO    Inhale 2 puffs into the

## 2023-03-28 NOTE — ED NOTES
Chief Complaint   Patient presents with    Otalgia     Right ear pain and swelling; visible redness          Shawn Valdez RN  03/28/23 0134

## 2023-03-28 NOTE — Clinical Note
Lissa Quispe was seen and treated in our emergency department on 3/28/2023. She may return to work on 03/30/2023. If you have any questions or concerns, please don't hesitate to call.       Li Montes MD

## 2023-06-23 ENCOUNTER — TELEPHONE (OUTPATIENT)
Dept: CARDIOLOGY CLINIC | Age: 43
End: 2023-06-23

## 2023-06-28 ENCOUNTER — TELEPHONE (OUTPATIENT)
Dept: CARDIOLOGY CLINIC | Age: 43
End: 2023-06-28

## 2023-10-25 ENCOUNTER — HOSPITAL ENCOUNTER (EMERGENCY)
Age: 43
Discharge: HOME | End: 2023-10-25
Attending: EMERGENCY MEDICINE
Payer: MEDICARE

## 2023-10-25 VITALS
TEMPERATURE: 98.5 F | HEIGHT: 72 IN | BODY MASS INDEX: 31.68 KG/M2 | OXYGEN SATURATION: 96 % | SYSTOLIC BLOOD PRESSURE: 100 MMHG | HEART RATE: 93 BPM | DIASTOLIC BLOOD PRESSURE: 64 MMHG | WEIGHT: 233.91 LBS | RESPIRATION RATE: 16 BRPM

## 2023-10-25 DIAGNOSIS — H60.391 INFECTIVE OTITIS EXTERNA OF RIGHT EAR: Primary | ICD-10-CM

## 2023-10-25 PROCEDURE — 99283 EMERGENCY DEPT VISIT LOW MDM: CPT

## 2023-10-25 RX ORDER — CIPROFLOXACIN AND DEXAMETHASONE 3; 1 MG/ML; MG/ML
4 SUSPENSION/ DROPS AURICULAR (OTIC) 2 TIMES DAILY
Qty: 7.5 ML | Refills: 0 | Status: SHIPPED | OUTPATIENT
Start: 2023-10-25 | End: 2023-11-01

## 2023-10-25 RX ORDER — CIPROFLOXACIN 500 MG/1
500 TABLET, FILM COATED ORAL 2 TIMES DAILY
Qty: 20 TABLET | Refills: 0 | Status: SHIPPED | OUTPATIENT
Start: 2023-10-25 | End: 2023-11-04

## 2023-10-25 ASSESSMENT — PAIN SCALES - GENERAL
PAINLEVEL_OUTOF10: 6
PAINLEVEL_OUTOF10: 6

## 2023-10-25 ASSESSMENT — PAIN - FUNCTIONAL ASSESSMENT
PAIN_FUNCTIONAL_ASSESSMENT: 0-10
PAIN_FUNCTIONAL_ASSESSMENT: 0-10

## 2023-10-25 ASSESSMENT — ENCOUNTER SYMPTOMS
COUGH: 0
SHORTNESS OF BREATH: 0
SORE THROAT: 0
ABDOMINAL DISTENTION: 0

## 2023-10-25 ASSESSMENT — PAIN DESCRIPTION - PAIN TYPE: TYPE: ACUTE PAIN

## 2023-10-25 ASSESSMENT — PAIN DESCRIPTION - LOCATION: LOCATION: EAR

## 2023-10-25 ASSESSMENT — PAIN DESCRIPTION - ORIENTATION: ORIENTATION: RIGHT

## 2023-10-25 NOTE — ED PROVIDER NOTES
[address] [facility] ENCOUNTER      Pt Name: Lyudmila Barrett  MRN: [id number]  [address] [facility] 1980  Date of evaluation: 10/25/2023  Provider: Sylvia Chandler MD    [address] [place] [address]       Chief Complaint   Patient presents with    Otalgia     C/o right ear pain since last night. Hx of cellulitis in same ear. + drainage from ear. lump on right deltoid from Pneumonia vaccine. HISTORY OF PRESENT ILLNESS   (Location/Symptom, Timing/Onset, Context/Setting, Quality, Duration, Modifying Factors, Severity)  Note limiting factors. Lyudmila Barrett is a 37 y.o. female with   Past Medical History:   Diagnosis Date    Abnormal radiograph 9/28/2015    Asthma     Atrial fibrillation (HCC)     Bipolar 1 disorder (HCC)     Conduct disorder     Conduct disturbance     COPD (chronic obstructive pulmonary disease) (HCC)     Diabetes mellitus (HCC)     Diabetes mellitus, type 2 (HCC)     Headache     Hypothyroidism     Hypothyroidism     Intellectual disability     Pneumonia     Schizophrenia ([address])     Scoliosis     Scoliosis 1995    Sleep apnea     CPAP    who presents to the emergency department with the chief complaint of   Chief Complaint   Patient presents with    Otalgia     C/o right ear pain since last night. Hx of cellulitis in same ear. + drainage from ear. lump on right deltoid from Pneumonia vaccine. . Comes in complaining of pain and discharge from her right ear, concern for reaction to her pneumonia shot in her right arm, and concern for painful area on her but right above the rectum. The patient has had multiple recurrent ear infections and she has chronic longstanding eczema on both the ears as well as all over her body. The patient's on multiple medications. There is no been any new changes to her medications. The patient denies any chest pain or shortness of breath. She denies any abdominal pain.   No other complaints or

## 2023-10-25 NOTE — DISCHARGE INSTRUCTIONS
Your primary care doctor and get an appointment for recheck on Friday if possible if not first of the week. If your symptoms worsen go to the emergency department at [facility]. You have this area on your ear which is infected and the drops and the antibiotic should help take care of that but she also have this early concerning area at your lower back right above your rectum which could be an early abscess. At this time there is nothing to drain but I want it rechecked by your primary care doctor.

## 2024-02-02 ENCOUNTER — TELEPHONE (OUTPATIENT)
Dept: PULMONOLOGY | Age: 44
End: 2024-02-02

## 2024-02-02 NOTE — TELEPHONE ENCOUNTER
Received fax from [facility] needed for Symbicort 160-4.5mcg/act     KEY:  [id number]  Patient name: Joanna Espana  : 1980       Sent to PA Pool

## 2024-02-04 DIAGNOSIS — J45.20 MILD INTERMITTENT ASTHMA WITHOUT COMPLICATION: ICD-10-CM

## 2024-02-05 RX ORDER — BUDESONIDE AND FORMOTEROL FUMARATE DIHYDRATE 160; 4.5 UG/1; UG/1
AEROSOL RESPIRATORY (INHALATION)
Qty: 10.2 G | Refills: 0 | OUTPATIENT
Start: 2024-02-05

## 2024-02-05 NOTE — TELEPHONE ENCOUNTER
Submitted PA for Symbicort 160-4.5MCG/ACT aerosol  Via CMM  (Key: [id number]) STATUS: PENDING.    Follow up done daily; if no decision with in three days we will refax.  If another three days goes by with no decision will call the insurance for status.

## 2024-02-06 NOTE — TELEPHONE ENCOUNTER
The medication is APPROVED.    Approved on February 5  Request Reference Number: [id number]. SYMBICORT -4.5 is approved through 12/31/2024. Your patient may now fill this prescription and it will be covered.  Authorization Expiration Date: 12/31/2024    If this requires a response please respond to the pool ( P MHCX PSC MEDICATION PRE-AUTH).      Thank you please advise patient.

## 2024-02-14 DIAGNOSIS — J45.20 MILD INTERMITTENT ASTHMA WITHOUT COMPLICATION: ICD-10-CM

## 2024-02-14 RX ORDER — IPRATROPIUM BROMIDE 17 UG/1
AEROSOL, METERED RESPIRATORY (INHALATION)
Qty: 12.9 G | Refills: 0 | OUTPATIENT
Start: 2024-02-14

## 2024-02-14 RX ORDER — BUDESONIDE AND FORMOTEROL FUMARATE DIHYDRATE 160; 4.5 UG/1; UG/1
AEROSOL RESPIRATORY (INHALATION)
Qty: 10.2 G | Refills: 0 | OUTPATIENT
Start: 2024-02-14

## 2024-03-12 DIAGNOSIS — J45.20 MILD INTERMITTENT ASTHMA WITHOUT COMPLICATION: ICD-10-CM

## 2024-03-12 RX ORDER — IPRATROPIUM BROMIDE 17 UG/1
AEROSOL, METERED RESPIRATORY (INHALATION)
Qty: 12.9 G | Refills: 11 | OUTPATIENT
Start: 2024-03-12

## 2024-03-12 RX ORDER — BUDESONIDE AND FORMOTEROL FUMARATE DIHYDRATE 160; 4.5 UG/1; UG/1
AEROSOL RESPIRATORY (INHALATION)
Qty: 10.2 G | Refills: 11 | OUTPATIENT
Start: 2024-03-12

## 2024-03-13 DIAGNOSIS — J45.20 MILD INTERMITTENT ASTHMA WITHOUT COMPLICATION: ICD-10-CM

## 2024-03-13 RX ORDER — IPRATROPIUM BROMIDE 17 UG/1
AEROSOL, METERED RESPIRATORY (INHALATION)
Qty: 12.9 G | Refills: 0 | OUTPATIENT
Start: 2024-03-13

## 2024-03-13 RX ORDER — BUDESONIDE AND FORMOTEROL FUMARATE DIHYDRATE 160; 4.5 UG/1; UG/1
AEROSOL RESPIRATORY (INHALATION)
Qty: 10.2 G | Refills: 0 | OUTPATIENT
Start: 2024-03-13

## 2024-03-22 DIAGNOSIS — J45.20 MILD INTERMITTENT ASTHMA WITHOUT COMPLICATION: ICD-10-CM

## 2024-03-22 RX ORDER — IPRATROPIUM BROMIDE 17 UG/1
AEROSOL, METERED RESPIRATORY (INHALATION)
Qty: 12.9 G | Refills: 11 | OUTPATIENT
Start: 2024-03-22

## 2024-03-22 RX ORDER — BUDESONIDE AND FORMOTEROL FUMARATE DIHYDRATE 160; 4.5 UG/1; UG/1
AEROSOL RESPIRATORY (INHALATION)
Qty: 10.2 G | Refills: 11 | OUTPATIENT
Start: 2024-03-22

## 2024-03-27 ENCOUNTER — OFFICE VISIT (OUTPATIENT)
Dept: PULMONOLOGY | Age: 44
End: 2024-03-27
Payer: MEDICARE

## 2024-03-27 ENCOUNTER — TELEPHONE (OUTPATIENT)
Dept: PULMONOLOGY | Age: 44
End: 2024-03-27

## 2024-03-27 VITALS
RESPIRATION RATE: 18 BRPM | HEART RATE: 93 BPM | WEIGHT: 223.2 LBS | SYSTOLIC BLOOD PRESSURE: 117 MMHG | HEIGHT: 72 IN | BODY MASS INDEX: 30.23 KG/M2 | DIASTOLIC BLOOD PRESSURE: 86 MMHG | OXYGEN SATURATION: 96 % | TEMPERATURE: 97.5 F

## 2024-03-27 DIAGNOSIS — Z72.0 TOBACCO ABUSE: ICD-10-CM

## 2024-03-27 DIAGNOSIS — G47.33 OSA ON CPAP: ICD-10-CM

## 2024-03-27 DIAGNOSIS — J45.20 MILD INTERMITTENT ASTHMA WITHOUT COMPLICATION: Primary | ICD-10-CM

## 2024-03-27 PROCEDURE — G8428 CUR MEDS NOT DOCUMENT: HCPCS | Performed by: INTERNAL MEDICINE

## 2024-03-27 PROCEDURE — 99214 OFFICE O/P EST MOD 30 MIN: CPT | Performed by: INTERNAL MEDICINE

## 2024-03-27 PROCEDURE — G8484 FLU IMMUNIZE NO ADMIN: HCPCS | Performed by: INTERNAL MEDICINE

## 2024-03-27 PROCEDURE — G8417 CALC BMI ABV UP PARAM F/U: HCPCS | Performed by: INTERNAL MEDICINE

## 2024-03-27 PROCEDURE — 4004F PT TOBACCO SCREEN RCVD TLK: CPT | Performed by: INTERNAL MEDICINE

## 2024-03-27 RX ORDER — BUDESONIDE AND FORMOTEROL FUMARATE DIHYDRATE 160; 4.5 UG/1; UG/1
2 AEROSOL RESPIRATORY (INHALATION) 2 TIMES DAILY
Qty: 1 EACH | Refills: 11 | Status: SHIPPED | OUTPATIENT
Start: 2024-03-27

## 2024-03-27 NOTE — PATIENT INSTRUCTIONS
Continue with symbicort and atrovent    Continue with CPAP    Stay active    Watch out for vaping and tobacco    Follow up in 6 months.  Bring in chip

## 2024-03-27 NOTE — TELEPHONE ENCOUNTER
----- Message from Sarbjit Ash, DO sent at 3/27/2024  9:45 AM EDT -----  I have attached orders for CPAP supplies.  [facility] is DME

## 2024-03-27 NOTE — PROGRESS NOTES
Chief complaint  This is a 44 y.o. year old female  who comes to see me with a chief complaint of   Chief Complaint   Patient presents with    Follow-up    Asthma     Need refills on both inhalers today    .    HPI  Here with cc of Moderate FREIDA with AHI of 26, Asthma    Has not been seen in 2 years.  She requested refills on inhalers recently but I declined them due to not being see in 2 years.  1 year is max amount of time I will prescribe without being seen.  She says she is doing ok otherwise.  On symbicort and atrovent.  Breathing is fine.  Never has asthma attacks.  Did not bring in chip for download.  Said machine is old and she needs supplies.  Vaping and smoking still.  She is smoking less             2/24/2022     1:10 PM 4/1/2021    11:30 AM 1/28/2020    10:14 AM 1/3/2019     2:34 PM 6/19/2018    12:45 PM 12/19/2017    10:17 AM 5/25/2017     2:03 PM   Sleep Medicine   Sitting and reading 0 0 0 1 1 0 0   Watching TV 0 0 0 1 1 0 0   Sitting, inactive in a public place (e.g. a theatre or a meeting) 0 0 0 1 1 0 0   As a passenger in a car for an hour without a break 0 0 0 1 1 1 0   Lying down to rest in the afternoon when circumstances permit 0 0 0 1 1 0 0   Sitting and talking to someone 0 0 0 1 1 0 0   Sitting quietly after a lunch without alcohol 0 0 0 1 1 0 0   In a car, while stopped for a few minutes in traffic 0 0 0 1 1 0 0   [place] Sleepiness Score 0 0 0 8 8 1 0   Neck circumference (Inches)   0           Current Outpatient Medications   Medication Sig Dispense Refill    budesonide-formoterol (SYMBICORT) 160-4.5 MCG/ACT AERO Inhale 2 puffs into the lungs 2 times daily 1 each 11    ipratropium (ATROVENT HFA) 17 MCG/ACT inhaler Inhale 2 puffs into the lungs 4 times daily 1 each 11    ATROVENT HFA 17 MCG/ACT inhaler SHAKE WELL AND INHALE TWO PUFFS BY MOUTH FOUR TIMES A DAY FOR ASTHMA. 12.9 g 11    SYMBICORT 160-4.5 MCG/ACT AERO INHALE TWO PUFFS BY MOUTH TWICE DAILY 10.2 g 11    lisinopril 
the supplies are both reasonable and necessary in reference to accepted standards of medicalpractice in treatment of this patient’s condition.    Sarbjit Ash DO      NPI: [id number]       Order Signed Date: 03/27/24    Electronically signed by Sarbjit Ash DO on 3/27/2024 at 9:37 AM

## 2024-10-02 ENCOUNTER — OFFICE VISIT (OUTPATIENT)
Dept: PULMONOLOGY | Age: 44
End: 2024-10-02
Payer: MEDICARE

## 2024-10-02 VITALS
OXYGEN SATURATION: 97 % | BODY MASS INDEX: 26.93 KG/M2 | TEMPERATURE: 97.3 F | SYSTOLIC BLOOD PRESSURE: 109 MMHG | HEART RATE: 88 BPM | HEIGHT: 72 IN | DIASTOLIC BLOOD PRESSURE: 77 MMHG | RESPIRATION RATE: 18 BRPM | WEIGHT: 198.8 LBS

## 2024-10-02 DIAGNOSIS — G47.33 OSA (OBSTRUCTIVE SLEEP APNEA): ICD-10-CM

## 2024-10-02 DIAGNOSIS — R06.83 SNORING: ICD-10-CM

## 2024-10-02 DIAGNOSIS — Z72.0 TOBACCO ABUSE: ICD-10-CM

## 2024-10-02 DIAGNOSIS — J45.20 MILD INTERMITTENT ASTHMA WITHOUT COMPLICATION: Primary | ICD-10-CM

## 2024-10-02 DIAGNOSIS — R63.4 WEIGHT LOSS: ICD-10-CM

## 2024-10-02 PROCEDURE — 99214 OFFICE O/P EST MOD 30 MIN: CPT | Performed by: INTERNAL MEDICINE

## 2024-10-02 PROCEDURE — G8484 FLU IMMUNIZE NO ADMIN: HCPCS | Performed by: INTERNAL MEDICINE

## 2024-10-02 PROCEDURE — G8417 CALC BMI ABV UP PARAM F/U: HCPCS | Performed by: INTERNAL MEDICINE

## 2024-10-02 PROCEDURE — G8427 DOCREV CUR MEDS BY ELIG CLIN: HCPCS | Performed by: INTERNAL MEDICINE

## 2024-10-02 PROCEDURE — 4004F PT TOBACCO SCREEN RCVD TLK: CPT | Performed by: INTERNAL MEDICINE

## 2024-10-02 ASSESSMENT — SLEEP AND FATIGUE QUESTIONNAIRES
HOW LIKELY ARE YOU TO NOD OFF OR FALL ASLEEP WHILE WATCHING TV: WOULD NEVER DOZE
HOW LIKELY ARE YOU TO NOD OFF OR FALL ASLEEP WHILE SITTING QUIETLY AFTER LUNCH WITHOUT ALCOHOL: WOULD NEVER DOZE
HOW LIKELY ARE YOU TO NOD OFF OR FALL ASLEEP WHILE SITTING INACTIVE IN A PUBLIC PLACE: WOULD NEVER DOZE
HOW LIKELY ARE YOU TO NOD OFF OR FALL ASLEEP WHILE SITTING AND TALKING TO SOMEONE: WOULD NEVER DOZE
ESS TOTAL SCORE: 2
HOW LIKELY ARE YOU TO NOD OFF OR FALL ASLEEP WHILE LYING DOWN TO REST IN THE AFTERNOON WHEN CIRCUMSTANCES PERMIT: MODERATE CHANCE OF DOZING
HOW LIKELY ARE YOU TO NOD OFF OR FALL ASLEEP IN A CAR, WHILE STOPPED FOR A FEW MINUTES IN TRAFFIC: WOULD NEVER DOZE
HOW LIKELY ARE YOU TO NOD OFF OR FALL ASLEEP WHILE SITTING AND READING: WOULD NEVER DOZE

## 2024-10-02 NOTE — PROGRESS NOTES
Chief complaint  This is a 44 y.o. year old female  who comes to see me with a chief complaint of   Chief Complaint   Patient presents with    Follow-up    Asthma    Sleep Apnea     Not using CPAP- want new sleep study since wt loss.    .    HPI  Here with cc of Moderate FREIDA with AHI of 26, Asthma    She says she has not used machine in some time.  Machine is broken.  However she has lost about 50 lbs too.  This has caused her sleeping to be better and feeling much less tired.  She does not nap and is not nearly as tired as she was.  Still snores.  Does not have insomnia    She says breathing is fine.  Uses symbicort bid.  Rare use of atrovent.  She is vaping and smoking.  Probably vaping more than smoking per her group home staff who are present             10/2/2024     9:55 AM 2/24/2022     1:10 PM 4/1/2021    11:30 AM 1/28/2020    10:14 AM 1/3/2019     2:34 PM 6/19/2018    12:45 PM 12/19/2017    10:17 AM   Sleep Medicine   Sitting and reading 0 0 0 0 1 1 0   Watching TV 0 0 0 0 1 1 0   Sitting, inactive in a public place (e.g. a theatre or a meeting) 0 0 0 0 1 1 0   As a passenger in a car for an hour without a break 0 0 0 0 1 1 1   Lying down to rest in the afternoon when circumstances permit 2 0 0 0 1 1 0   Sitting and talking to someone 0 0 0 0 1 1 0   Sitting quietly after a lunch without alcohol 0 0 0 0 1 1 0   In a car, while stopped for a few minutes in traffic 0 0 0 0 1 1 0   [place] Sleepiness Score 2 0 0 0 8 8 1   Neck (Inches) 15.5   0          Current Outpatient Medications   Medication Sig Dispense Refill    budesonide-formoterol (SYMBICORT) 160-4.5 MCG/ACT AERO Inhale 2 puffs into the lungs 2 times daily 1 each 11    ipratropium (ATROVENT HFA) 17 MCG/ACT inhaler Inhale 2 puffs into the lungs 4 times daily 1 each 11    ATROVENT HFA 17 MCG/ACT inhaler SHAKE WELL AND INHALE TWO PUFFS BY MOUTH FOUR TIMES A DAY FOR ASTHMA. 12.9 g 11    SYMBICORT 160-4.5 MCG/ACT AERO INHALE TWO PUFFS BY MOUTH TWICE DAILY

## 2024-10-23 ENCOUNTER — HOSPITAL ENCOUNTER (OUTPATIENT)
Dept: SLEEP CENTER | Age: 44
Discharge: HOME | End: 2024-10-23
Attending: INTERNAL MEDICINE
Payer: MEDICARE

## 2024-10-23 DIAGNOSIS — G47.33 OSA (OBSTRUCTIVE SLEEP APNEA): ICD-10-CM

## 2024-10-23 PROCEDURE — 95810 POLYSOM 6/> YRS 4/> PARAM: CPT

## 2024-10-25 ENCOUNTER — TELEPHONE (OUTPATIENT)
Dept: PULMONOLOGY | Age: 44
End: 2024-10-25

## 2024-10-25 NOTE — TELEPHONE ENCOUNTER
PSG Sleep study showed mild FREIDA (on a scale of mild, moderate and severe).  AHI was 7.3  per hr. (Average times per hr breathing was obstructed).  O2 Desaturations to 71% (lowest o2)   Dr Recommends:    Follow up with the patient's sleep physician to discuss results      Avoid sedatives, alcohol and caffeinated drinks at bedtime.    Recommend:  titration     Sleep Lab used: [facility]    Avoid driving while sleepy.       Caregiver Irish notified of results    Transferred to the sleep lab

## 2024-10-25 NOTE — TELEPHONE ENCOUNTER
Let patient know that her sleep apnea is mild and her oxygen dropped.  She may want to consider going back on CPAP.

## 2024-10-29 ENCOUNTER — TELEPHONE (OUTPATIENT)
Dept: PULMONOLOGY | Age: 44
End: 2024-10-29

## 2024-10-29 NOTE — PROGRESS NOTES
Chief complaint  This is a 36y.o. year old female  who presents with a chief complaint of   Chief Complaint   Patient presents with    Asthma     This was a telehealth visit performed during the coronavirus pandemic using video and audio. Patient provided consent for the visit. Participants were Deirdre Mckeon MD in the office and patient and her care coordinator at home. HPI  44-year-old woman with asthma, pulmonary hypertension, tobacco use and obstructive sleep apnea (on CPAP) presents for follow-up. 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 She is try to stay indoors. She denies shortness of breath. She has an occasional cough. She denies nighttime awakenings due to cough or sputum production. She is using Symbicort twice daily and Atrovent 4 times daily scheduled. She takes Lasix 20 mg daily. She uses CPAP at night. She is still smoking, but has cut down to 4 to 5 cigarettes a day. Past medical history:   She was admitted to [facility] from 9/23-9/30/15 for asthma exacerbation. She was treated with steroids, nebulizers, antibiotics and pulmonary toilet. Chest imaging showed atelectasis involving most lobes. There was no evidence of pulmonary embolus. She did have a significant oxygen requirement- needing up to 10L NC. This was eventually weaned to 2L. She was discharged home with this. Supplemental oxygen was discontinued in October 2017.     Past Medical History:   Diagnosis Date    Asthma     Atrial fibrillation ([address]     Conduct disorder          Diabetes mellitus ([facility]     Diabetes mellitus, type 2 ([facility]     Hypothyroidism     Pneumonia     Scoliosis     Scoliosis 1995    Sleep apnea     CPAP       Past Surgical History:   Procedure Laterality Date    APPENDECTOMY      KNEE ARTHROSCOPY Right 07/01/2016    LUNG SURGERY         Current Outpatient Medications   Medication Sig Dispense Refill    ketoconazole Patient Education   Preventive Care Advice   This is general advice given by our system to help you stay healthy. However, your care team may have specific advice just for you. Please talk to your care team about your preventive care needs.  Nutrition  Eat 5 or more servings of fruits and vegetables each day.  Try wheat bread, brown rice and whole grain pasta (instead of white bread, rice, and pasta).  Get enough calcium and vitamin D. Check the label on foods and aim for 100% of the RDA (recommended daily allowance).  Lifestyle  Exercise at least 150 minutes each week  (30 minutes a day, 5 days a week).  Do muscle strengthening activities 2 days a week. These help control your weight and prevent disease.  No smoking.  Wear sunscreen to prevent skin cancer.  Have a dental exam and cleaning every 6 months.  Yearly exams  See your health care team every year to talk about:  Any changes in your health.  Any medicines your care team has prescribed.  Preventive care, family planning, and ways to prevent chronic diseases.  Shots (vaccines)   HPV shots (up to age 26), if you've never had them before.  Hepatitis B shots (up to age 59), if you've never had them before.  COVID-19 shot: Get this shot when it's due.  Flu shot: Get a flu shot every year.  Tetanus shot: Get a tetanus shot every 10 years.  Pneumococcal, hepatitis A, and RSV shots: Ask your care team if you need these based on your risk.  Shingles shot (for age 50 and up)  General health tests  Diabetes screening:  Starting at age 35, Get screened for diabetes at least every 3 years.  If you are younger than age 35, ask your care team if you should be screened for diabetes.  Cholesterol test: At age 39, start having a cholesterol test every 5 years, or more often if advised.  Bone density scan (DEXA): At age 50, ask your care team if you should have this scan for osteoporosis (brittle bones).  Hepatitis C: Get tested at least once in your life.  STIs (sexually  (NIZORAL) 2 % shampoo Apply topically once daily until healed. 1 Bottle 2    nystatin-triamcinolone (MYCOLOG) 238514-3.1 UNIT/GM-% ointment Apply topically 2 times daily to breast line and abdomen and then apply powder over the ointment 1 Tube 2    furosemide (LASIX) 20 MG tablet Take 1 tablet by mouth daily 30 tablet 0    aspirin 81 MG chewable tablet Take 81 mg by mouth daily      loratadine (CLARITIN) 10 MG tablet Take 10 mg by mouth daily      CYCLAFEM 1/35 1-35 MG-MCG per tablet 1 tablet daily      LORazepam (ATIVAN) 0.5 MG tablet 1 tablet every morning (before breakfast)      metoprolol (LOPRESSOR) 25 MG tablet TAKE ONE (1) TABLET BY MOUTH TWICE A DAY FOR TACHYCARDIA 60 tablet 6    ipratropium (ATROVENT HFA) 17 MCG/ACT inhaler Inhale 2 puffs into the lungs 4 times daily.  therapeutic multivitamin-minerals (THERAGRAN-M) tablet Take 1 tablet by mouth daily.  metFORMIN (GLUCOPHAGE) 500 MG tablet Take 500 mg by mouth 2 times daily (with meals).  docusate sodium (COLACE) 100 MG capsule Take 100 mg by mouth daily.  cloZAPine (CLOZARIL) 25 MG tablet Take 25 mg by mouth daily.  topiramate (TOPAMAX) 50 MG tablet Take 50 mg by mouth nightly.  benztropine (COGENTIN) 0.5 MG tablet Take 0.5 mg by mouth daily.  levothyroxine (SYNTHROID) 75 MCG tablet Take 75 mcg by mouth Daily.  Calcium-Vitamin D-Vitamin K (VIACTIV PO) Take 1 tablet by mouth 3 times daily.  divalproex (DEPAKOTE) 500 MG ER tablet Take 1,000 mg by mouth nightly.  budesonide-formoterol (SYMBICORT) 80-4.5 MCG/ACT AERO Inhale 2 puffs into the lungs 2 times daily.  FLUoxetine (PROZAC) 20 MG capsule Take 20 mg by mouth daily. No current facility-administered medications for this visit.         Allergies   Allergen Reactions    Diphenoxylate-Atropine     Lamictal [Lamotrigine]     Pcn [Penicillins]        Family History   Problem Relation Age of Onset    Heart Failure Mother     Depression transmitted infections)  Before age 24: Ask your care team if you should be screened for STIs.  After age 24: Get screened for STIs if you're at risk. You are at risk for STIs (including HIV) if:  You are sexually active with more than one person.  You don't use condoms every time.  You or a partner was diagnosed with a sexually transmitted infection.  If you are at risk for HIV, ask about PrEP medicine to prevent HIV.  Get tested for HIV at least once in your life, whether you are at risk for HIV or not.  Cancer screening tests  Cervical cancer screening: If you have a cervix, begin getting regular cervical cancer screening tests starting at age 21.  Breast cancer scan (mammogram): If you've ever had breasts, begin having regular mammograms starting at age 40. This is a scan to check for breast cancer.  Colon cancer screening: It is important to start screening for colon cancer at age 45.  Have a colonoscopy test every 10 years (or more often if you're at risk) Or, ask your provider about stool tests like a FIT test every year or Cologuard test every 3 years.  To learn more about your testing options, visit:   .  For help making a decision, visit:   https://bit.ly/eb95267.  Prostate cancer screening test: If you have a prostate, ask your care team if a prostate cancer screening test (PSA) at age 55 is right for you.  Lung cancer screening: If you are a current or former smoker ages 50 to 80, ask your care team if ongoing lung cancer screenings are right for you.  For informational purposes only. Not to replace the advice of your health care provider. Copyright   2023 [employer]. All rights reserved. Clinically reviewed by the [facility] Transitions Program. [employer] 380610 - REV 01/24.

## 2024-10-29 NOTE — TELEPHONE ENCOUNTER
Sarbjit Ash, DO  [facility] Staff4 days ago       Let patient know that her sleep apnea is mild and her oxygen dropped.  She may want to consider going back on CPAP.            Original note was entered under Barrera name by accident.  Starting new phone message with correct MD name

## 2025-01-20 ENCOUNTER — TELEPHONE (OUTPATIENT)
Dept: PULMONOLOGY | Age: 45
End: 2025-01-20

## 2025-01-20 DIAGNOSIS — J45.909 MODERATE ASTHMA WITHOUT COMPLICATION, UNSPECIFIED WHETHER PERSISTENT: Primary | ICD-10-CM

## 2025-01-20 RX ORDER — BUDESONIDE AND FORMOTEROL FUMARATE DIHYDRATE 160; 4.5 UG/1; UG/1
2 AEROSOL RESPIRATORY (INHALATION) 2 TIMES DAILY
Qty: 10.2 G | Refills: 11 | Status: SHIPPED | OUTPATIENT
Start: 2025-01-20

## 2025-01-29 DIAGNOSIS — J45.20 MILD INTERMITTENT ASTHMA WITHOUT COMPLICATION: ICD-10-CM

## 2025-01-31 NOTE — TELEPHONE ENCOUNTER
Last appt: Visit date not found    Next appt: 4/2/2025    Medication matches medication on Epic list

## 2025-02-03 RX ORDER — ALBUTEROL SULFATE 90 UG/1
AEROSOL, METERED RESPIRATORY (INHALATION)
Qty: 3 EACH | Refills: 3 | Status: SHIPPED | OUTPATIENT
Start: 2025-02-03

## 2025-02-17 ENCOUNTER — TELEPHONE (OUTPATIENT)
Dept: ADMINISTRATIVE | Age: 45
End: 2025-02-17

## 2025-02-17 NOTE — TELEPHONE ENCOUNTER
Received fax from [facility] needed for Symbicort 160-4.5mcg     KEY:  [id number]  Patient name: Joanna ISAAC Espana  : 1980       Sent to PA Pool

## 2025-02-17 NOTE — TELEPHONE ENCOUNTER
Submitted PA for Budesonide-Formoterol Fumarate 160-4.5MCG/ACT aerosol   Via CMM Key: [id number]  STATUS: PENDING.    Follow up done daily; if no decision with in three days we will refax.  If another three days goes by with no decision will call the insurance for status.

## 2025-02-20 RX ORDER — IPRATROPIUM BROMIDE 17 UG/1
AEROSOL, METERED RESPIRATORY (INHALATION)
Qty: 12.9 G | Refills: 11 | Status: SHIPPED | OUTPATIENT
Start: 2025-02-20

## 2025-03-06 NOTE — PROGRESS NOTES
Acetaminophen (CHLORASEPTIC SORE THROAT PO) Take by mouth      Acetaminophen (CHLORASEPTIC SORE THROAT PO) Take by mouth as needed (sore throat)      Sennosides (CHOCOLATED LAXATIVE PO) Take by mouth as needed      clobetasol (TEMOVATE) 0.05 % external solution Apply topically 2 times daily as needed      FLUoxetine (PROZAC) 40 MG capsule Take 1 capsule by mouth daily      fluticasone (FLONASE) 50 MCG/ACT nasal spray 1 spray by Nasal route daily      hydrocortisone 2.5 % cream Apply topically as needed (hemorrhoids)      ibuprofen (ADVIL;MOTRIN) 800 MG tablet Take 1 tablet by mouth every 8 hours as needed for Pain      loperamide (IMODIUM) 2 MG capsule Take 1 capsule by mouth as needed for Diarrhea      levothyroxine (SYNTHROID) 137 MCG tablet Take 1 tablet by mouth Daily      medroxyPROGESTERone (DEPO-PROVERA) 150 MG/ML injection Inject 150 mg into the muscle every 3 months      metFORMIN (GLUCOPHAGE) 1000 MG tablet Take 1 tablet by mouth 2 times daily (with meals)      Calcium & Magnesium Carbonates (MYLANTA PO) Take by mouth every 4 hours as needed      NYSTATIN PO Take by mouth in the morning and at bedtime      NYSTATIN PO Take by mouth as needed      olopatadine (PATADAY) 0.2 % SOLN ophthalmic solution Place 1 drop into both eyes in the morning, at noon, and at bedtime      polyethylene glycol (GLYCOLAX) 17 GM/SCOOP powder Take 17 g by mouth daily      TRULICITY 3 MG/0.5ML SOAJ Inject 3 mg into the skin once a week      Risankizumab-rzaa (SKYRIZI SC) Inject 150 mg into the skin every 3 months      Multiple Vitamins-Iron (TAB-A-EL/IRON/BETA CAROTENE) TABS daily      tacrolimus (PROTOPIC) 0.1 % ointment APPLY DAILY UNDER BREASTS AND TO GLUTEAL CLEFT. (BURNING SENSATION IS TEMPORARY)      tolnaftate (TINACTIN) 1 % spray Apply topically 2 times daily Apply topically 2 times daily.      ATROVENT HFA 17 MCG/ACT inhaler INHALE 2 PUFFS INTO THE LUNGS 4 TIMES DAILY 12.9 g 11    albuterol sulfate HFA (VENTOLIN HFA)

## 2025-03-24 ENCOUNTER — OFFICE VISIT (OUTPATIENT)
Dept: CARDIOLOGY CLINIC | Age: 45
End: 2025-03-24
Payer: MEDICARE

## 2025-03-24 VITALS
WEIGHT: 190.4 LBS | DIASTOLIC BLOOD PRESSURE: 70 MMHG | SYSTOLIC BLOOD PRESSURE: 104 MMHG | HEART RATE: 85 BPM | BODY MASS INDEX: 25.82 KG/M2

## 2025-03-24 DIAGNOSIS — R00.0 TACHYCARDIA: ICD-10-CM

## 2025-03-24 DIAGNOSIS — I48.0 PAROXYSMAL ATRIAL FIBRILLATION (HCC): Primary | ICD-10-CM

## 2025-03-24 DIAGNOSIS — R00.2 PALPITATIONS: ICD-10-CM

## 2025-03-24 DIAGNOSIS — I10 BENIGN ESSENTIAL HTN: ICD-10-CM

## 2025-03-24 PROCEDURE — G8417 CALC BMI ABV UP PARAM F/U: HCPCS | Performed by: NURSE PRACTITIONER

## 2025-03-24 PROCEDURE — 99214 OFFICE O/P EST MOD 30 MIN: CPT | Performed by: NURSE PRACTITIONER

## 2025-03-24 PROCEDURE — 4004F PT TOBACCO SCREEN RCVD TLK: CPT | Performed by: NURSE PRACTITIONER

## 2025-03-24 PROCEDURE — 3078F DIAST BP <80 MM HG: CPT | Performed by: NURSE PRACTITIONER

## 2025-03-24 PROCEDURE — 3074F SYST BP LT 130 MM HG: CPT | Performed by: NURSE PRACTITIONER

## 2025-03-24 PROCEDURE — G8427 DOCREV CUR MEDS BY ELIG CLIN: HCPCS | Performed by: NURSE PRACTITIONER

## 2025-03-24 PROCEDURE — 93000 ELECTROCARDIOGRAM COMPLETE: CPT | Performed by: NURSE PRACTITIONER

## 2025-03-24 RX ORDER — HYDROCORTISONE 25 MG/G
CREAM TOPICAL PRN
COMMUNITY

## 2025-03-24 RX ORDER — POLYETHYLENE GLYCOL 3350 17 G/17G
17 POWDER, FOR SOLUTION ORAL DAILY
COMMUNITY
Start: 2024-06-05

## 2025-03-24 RX ORDER — CALCIUM CARB/VITAMIN D3/VIT K1 500-100-40
TABLET,CHEWABLE ORAL 2 TIMES DAILY
COMMUNITY

## 2025-03-24 RX ORDER — LEVOTHYROXINE SODIUM 137 UG/1
137 TABLET ORAL DAILY
COMMUNITY
Start: 2025-03-18

## 2025-03-24 RX ORDER — BENZONATATE 100 MG/1
CAPSULE, LIQUID FILLED ORAL DAILY
COMMUNITY
Start: 2025-03-18

## 2025-03-24 RX ORDER — FLUOXETINE HYDROCHLORIDE 40 MG/1
40 CAPSULE ORAL DAILY
COMMUNITY

## 2025-03-24 RX ORDER — MINERAL OIL/HYDROPHIL PETROLAT
OINTMENT (GRAM) TOPICAL PRN
COMMUNITY

## 2025-03-24 RX ORDER — CETIRIZINE HYDROCHLORIDE 10 MG/1
10 TABLET ORAL DAILY
COMMUNITY
Start: 2024-05-08

## 2025-03-24 RX ORDER — IBUPROFEN 800 MG/1
800 TABLET, FILM COATED ORAL EVERY 8 HOURS PRN
COMMUNITY

## 2025-03-24 RX ORDER — MEDROXYPROGESTERONE ACETATE 150 MG/ML
150 INJECTION, SUSPENSION INTRAMUSCULAR
COMMUNITY
Start: 2024-10-04

## 2025-03-24 RX ORDER — CLOBETASOL PROPIONATE 0.5 MG/ML
SOLUTION TOPICAL 2 TIMES DAILY PRN
COMMUNITY
Start: 2024-10-08

## 2025-03-24 RX ORDER — OLOPATADINE HYDROCHLORIDE 2 MG/ML
1 SOLUTION/ DROPS OPHTHALMIC 3 TIMES DAILY
COMMUNITY
Start: 2024-05-08

## 2025-03-24 RX ORDER — MINERAL OIL/HYDROPHIL PETROLAT
OINTMENT (GRAM) TOPICAL 2 TIMES DAILY
COMMUNITY

## 2025-03-24 RX ORDER — DULAGLUTIDE 3 MG/.5ML
3 INJECTION, SOLUTION SUBCUTANEOUS WEEKLY
COMMUNITY
Start: 2025-03-04

## 2025-03-24 RX ORDER — FLUTICASONE PROPIONATE 50 MCG
1 SPRAY, SUSPENSION (ML) NASAL DAILY
COMMUNITY
Start: 2024-05-08

## 2025-03-24 RX ORDER — LOPERAMIDE HYDROCHLORIDE 2 MG/1
2 CAPSULE ORAL PRN
COMMUNITY

## 2025-03-24 RX ORDER — TACROLIMUS 1 MG/G
OINTMENT TOPICAL
COMMUNITY
Start: 2025-01-07

## 2025-04-02 ENCOUNTER — OFFICE VISIT (OUTPATIENT)
Dept: PULMONOLOGY | Age: 45
End: 2025-04-02
Payer: MEDICARE

## 2025-04-02 VITALS
SYSTOLIC BLOOD PRESSURE: 116 MMHG | HEIGHT: 72 IN | OXYGEN SATURATION: 97 % | WEIGHT: 191.4 LBS | TEMPERATURE: 97.2 F | DIASTOLIC BLOOD PRESSURE: 78 MMHG | HEART RATE: 98 BPM | BODY MASS INDEX: 25.92 KG/M2

## 2025-04-02 DIAGNOSIS — Z72.0 TOBACCO ABUSE: ICD-10-CM

## 2025-04-02 DIAGNOSIS — J45.20 MILD INTERMITTENT ASTHMA WITHOUT COMPLICATION: Primary | ICD-10-CM

## 2025-04-02 DIAGNOSIS — U07.0 VAPING-RELATED DISORDER: ICD-10-CM

## 2025-04-02 DIAGNOSIS — G47.33 OSA (OBSTRUCTIVE SLEEP APNEA): ICD-10-CM

## 2025-04-02 DIAGNOSIS — R63.4 WEIGHT LOSS: ICD-10-CM

## 2025-04-02 PROCEDURE — G8417 CALC BMI ABV UP PARAM F/U: HCPCS | Performed by: INTERNAL MEDICINE

## 2025-04-02 PROCEDURE — 99214 OFFICE O/P EST MOD 30 MIN: CPT | Performed by: INTERNAL MEDICINE

## 2025-04-02 PROCEDURE — 4004F PT TOBACCO SCREEN RCVD TLK: CPT | Performed by: INTERNAL MEDICINE

## 2025-04-02 PROCEDURE — G2211 COMPLEX E/M VISIT ADD ON: HCPCS | Performed by: INTERNAL MEDICINE

## 2025-04-02 PROCEDURE — G8427 DOCREV CUR MEDS BY ELIG CLIN: HCPCS | Performed by: INTERNAL MEDICINE

## 2025-04-02 NOTE — PROGRESS NOTES
Chief complaint  This is a 45 y.o. year old female  who comes to see me with a chief complaint of   Chief Complaint   Patient presents with    Follow-up    Asthma    .    HPI  Here with cc of asthma and FREIDA    She did repeat PSG since all of her weight loss and her AHI went from 26 to 7.  Her CPAP machine was broken anyway    She is not SOB and on symbicort and atrovent.  She has not used albuterol at all.      She is still smoking and vaping           Current Outpatient Medications   Medication Sig Dispense Refill    mineral oil-hydrophilic petrolatum (AQUAPHOR) ointment Apply topically as needed for Dry Skin Apply topically as needed.      mineral oil-hydrophilic petrolatum (AQUAPHOR) ointment Apply topically in the morning and at bedtime For dry skin      cetirizine (ZYRTEC) 10 MG tablet Take 1 tablet by mouth daily      Acetaminophen (CHLORASEPTIC SORE THROAT PO) Take by mouth      Acetaminophen (CHLORASEPTIC SORE THROAT PO) Take by mouth as needed (sore throat)      Sennosides (CHOCOLATED LAXATIVE PO) Take by mouth as needed      clobetasol (TEMOVATE) 0.05 % external solution Apply topically 2 times daily as needed      FLUoxetine (PROZAC) 40 MG capsule Take 1 capsule by mouth daily      fluticasone (FLONASE) 50 MCG/ACT nasal spray 1 spray by Nasal route daily      hydrocortisone 2.5 % cream Apply topically as needed (hemorrhoids)      ibuprofen (ADVIL;MOTRIN) 800 MG tablet Take 1 tablet by mouth every 8 hours as needed for Pain      loperamide (IMODIUM) 2 MG capsule Take 1 capsule by mouth as needed for Diarrhea      levothyroxine (SYNTHROID) 137 MCG tablet Take 1 tablet by mouth Daily      medroxyPROGESTERone (DEPO-PROVERA) 150 MG/ML injection Inject 150 mg into the muscle every 3 months      metFORMIN (GLUCOPHAGE) 1000 MG tablet Take 1 tablet by mouth 2 times daily (with meals)      Calcium & Magnesium Carbonates (MYLANTA PO) Take by mouth every 4 hours as needed      NYSTATIN PO Take by mouth in the morning

## 2025-04-08 ENCOUNTER — HOSPITAL ENCOUNTER (OUTPATIENT)
Dept: CARDIOLOGY | Age: 45
Discharge: HOME | End: 2025-04-10
Payer: MEDICARE

## 2025-04-08 VITALS
HEIGHT: 72 IN | DIASTOLIC BLOOD PRESSURE: 66 MMHG | WEIGHT: 191 LBS | BODY MASS INDEX: 25.87 KG/M2 | SYSTOLIC BLOOD PRESSURE: 106 MMHG

## 2025-04-08 DIAGNOSIS — I48.0 PAROXYSMAL ATRIAL FIBRILLATION (HCC): ICD-10-CM

## 2025-04-08 LAB
ECHO AO ASC DIAM: 2.6 CM
ECHO AO ASCENDING AORTA INDEX: 1.24 CM/M2
ECHO AO ROOT DIAM: 2.7 CM
ECHO AO ROOT INDEX: 1.29 CM/M2
ECHO AV MEAN GRADIENT: 10 MMHG
ECHO AV MEAN VELOCITY: 1.5 M/S
ECHO AV PEAK GRADIENT: 19 MMHG
ECHO AV PEAK VELOCITY: 2.1 M/S
ECHO AV VELOCITY RATIO: 0.43
ECHO AV VTI: 43.5 CM
ECHO BSA: 2.1 M2
ECHO EST RA PRESSURE: 3 MMHG
ECHO LA AREA 2C: 12.3 CM2
ECHO LA AREA 4C: 13.1 CM2
ECHO LA DIAMETER INDEX: 1.34 CM/M2
ECHO LA DIAMETER: 2.8 CM
ECHO LA MAJOR AXIS: 5.3 CM
ECHO LA MINOR AXIS: 3.9 CM
ECHO LA TO AORTIC ROOT RATIO: 1.04
ECHO LA VOL MOD A2C: 30 ML (ref 22–52)
ECHO LA VOL MOD A4C: 27 ML (ref 22–52)
ECHO LA VOLUME INDEX MOD A2C: 14 ML/M2 (ref 16–34)
ECHO LA VOLUME INDEX MOD A4C: 13 ML/M2 (ref 16–34)
ECHO LV E' LATERAL VELOCITY: 9.46 CM/S
ECHO LV E' SEPTAL VELOCITY: 8.05 CM/S
ECHO LV EDV A4C: 81 ML
ECHO LV EDV INDEX A4C: 39 ML/M2
ECHO LV EF PHYSICIAN: 58 %
ECHO LV EJECTION FRACTION A4C: 53 %
ECHO LV ESV A4C: 38 ML
ECHO LV ESV INDEX A4C: 18 ML/M2
ECHO LV FRACTIONAL SHORTENING: 30 % (ref 28–44)
ECHO LV INTERNAL DIMENSION DIASTOLE INDEX: 2.54 CM/M2
ECHO LV INTERNAL DIMENSION DIASTOLIC: 5.3 CM (ref 3.9–5.3)
ECHO LV INTERNAL DIMENSION SYSTOLIC INDEX: 1.77 CM/M2
ECHO LV INTERNAL DIMENSION SYSTOLIC: 3.7 CM
ECHO LV IVSD: 0.9 CM (ref 0.6–0.9)
ECHO LV MASS 2D: 162.1 G (ref 67–162)
ECHO LV MASS INDEX 2D: 77.6 G/M2 (ref 43–95)
ECHO LV POSTERIOR WALL DIASTOLIC: 0.8 CM (ref 0.6–0.9)
ECHO LV RELATIVE WALL THICKNESS RATIO: 0.3
ECHO LVOT AV VTI INDEX: 0.41
ECHO LVOT MEAN GRADIENT: 2 MMHG
ECHO LVOT PEAK GRADIENT: 3 MMHG
ECHO LVOT PEAK VELOCITY: 0.9 M/S
ECHO LVOT VTI: 17.8 CM
ECHO MV A VELOCITY: 0.89 M/S
ECHO MV E VELOCITY: 0.72 M/S
ECHO MV E/A RATIO: 0.81
ECHO MV E/E' LATERAL: 7.61
ECHO MV E/E' RATIO (AVERAGED): 8.28
ECHO MV E/E' SEPTAL: 8.94
ECHO MV LVOT VTI INDEX: 1.61
ECHO MV MAX VELOCITY: 1.1 M/S
ECHO MV MEAN GRADIENT: 2 MMHG
ECHO MV MEAN VELOCITY: 0.7 M/S
ECHO MV PEAK GRADIENT: 5 MMHG
ECHO MV VTI: 28.7 CM
ECHO RA AREA 4C: 9.2 CM2
ECHO RA END SYSTOLIC VOLUME APICAL 4 CHAMBER INDEX BSA: 8 ML/M2
ECHO RA VOLUME: 16 ML
ECHO RIGHT VENTRICULAR SYSTOLIC PRESSURE (RVSP): 21 MMHG
ECHO RV BASAL DIMENSION: 2.9 CM
ECHO RV FREE WALL PEAK S': 10.2 CM/S
ECHO RV LONGITUDINAL DIMENSION: 6.2 CM
ECHO RV MID DIMENSION: 2.5 CM
ECHO RV TAPSE: 1.9 CM (ref 1.7–?)
ECHO TV REGURGITANT MAX VELOCITY: 2.12 M/S
ECHO TV REGURGITANT PEAK GRADIENT: 18 MMHG

## 2025-04-08 PROCEDURE — 93306 TTE W/DOPPLER COMPLETE: CPT

## 2025-04-09 ENCOUNTER — RESULTS FOLLOW-UP (OUTPATIENT)
Dept: CARDIOLOGY CLINIC | Age: 45
End: 2025-04-09

## 2025-06-27 ENCOUNTER — HOSPITAL ENCOUNTER (EMERGENCY)
Age: 45
Discharge: HOME | End: 2025-06-27

## 2025-06-27 VITALS
BODY MASS INDEX: 25.05 KG/M2 | TEMPERATURE: 97.8 F | SYSTOLIC BLOOD PRESSURE: 125 MMHG | RESPIRATION RATE: 16 BRPM | HEART RATE: 83 BPM | OXYGEN SATURATION: 96 % | WEIGHT: 184.97 LBS | HEIGHT: 72 IN | DIASTOLIC BLOOD PRESSURE: 81 MMHG

## 2025-06-27 DIAGNOSIS — H60.501 ACUTE OTITIS EXTERNA OF RIGHT EAR, UNSPECIFIED TYPE: Primary | ICD-10-CM

## 2025-06-27 RX ORDER — NEOMYCIN SULFATE, POLYMYXIN B SULFATE AND HYDROCORTISONE 10; 3.5; 1 MG/ML; MG/ML; [USP'U]/ML
3 SUSPENSION/ DROPS AURICULAR (OTIC) 3 TIMES DAILY
Qty: 10 ML | Refills: 0 | Status: SHIPPED | OUTPATIENT
Start: 2025-06-27 | End: 2025-07-07

## 2025-06-27 ASSESSMENT — LIFESTYLE VARIABLES
HOW MANY STANDARD DRINKS CONTAINING ALCOHOL DO YOU HAVE ON A TYPICAL DAY: 1 OR 2
HOW OFTEN DO YOU HAVE A DRINK CONTAINING ALCOHOL: MONTHLY OR LESS

## 2025-06-27 ASSESSMENT — PAIN DESCRIPTION - DESCRIPTORS: DESCRIPTORS: OTHER (COMMENT)

## 2025-06-27 ASSESSMENT — PAIN SCALES - GENERAL
PAINLEVEL_OUTOF10: 5
PAINLEVEL_OUTOF10: 5

## 2025-06-27 ASSESSMENT — PAIN - FUNCTIONAL ASSESSMENT
PAIN_FUNCTIONAL_ASSESSMENT: 0-10
PAIN_FUNCTIONAL_ASSESSMENT: 0-10

## 2025-06-27 ASSESSMENT — PAIN DESCRIPTION - ORIENTATION: ORIENTATION: RIGHT

## 2025-06-27 ASSESSMENT — PAIN DESCRIPTION - LOCATION: LOCATION: EAR

## 2025-06-27 NOTE — ED TRIAGE NOTES
Pt presents with skin inflammation to Right ear and behind right ear. Starting yesterday, Pain 5/10. Pt reports hx of cellulitis at same site.

## 2025-06-28 NOTE — ED PROVIDER NOTES
**ADVANCED PRACTICE PROVIDER, I HAVE EVALUATED THIS PATIENT**        [facility] EMERGENCY DEPARTMENT  EMERGENCY DEPARTMENT ENCOUNTER      Pt Name: Joanna Espana  MRN:[id number]  Birthdate 1980  Date of evaluation: 6/27/2025  Provider: Brandon Kaur PA-C  Note Started: 8:02 PM EDT 6/27/25        Chief Complaint:    Chief Complaint   Patient presents with    Ear Pain     Pt presents with skin inflammation to Right ear and behind right ear. Starting yesterday, Pain 5/10. Pt reports hx of cellulitis at same site.          Nursing Notes, Past Medical Hx, Past Surgical Hx, Social Hx, Allergies, and Family Hx were all reviewed and agreed with or any disagreements were addressed in the HPI.    HPI: (Location, Duration, Timing, Severity, Quality, Assoc Sx, Context, Modifying factors)    History From: Patient  Limitations to history : None    Social Determinants Significantly Affecting Health : None    Chief Complaint of complaint of dryness around her ear.  Also complaining of ringing in the right ear and slight pain.  Sounds like things are muffled.  Like she is in a tunnel.  She denies any drainage.  She has been putting Q-tips in the ear.  And some slight discomfort in the left ear as well.  No fever, no cough, she does not complain of any head congestion.  No neck pain or neck stiffness.  No chest pain.  No weakness.  No lightheaded or dizziness.  No other complaints    This is a  45 y.o. female who presents to the emergency room with the above complaint    PastMedical/Surgical History:      Diagnosis Date    Abnormal radiograph 9/28/2015    Asthma     Atrial fibrillation (HCC)     Bipolar 1 disorder (HCC)     Conduct disorder     Conduct disturbance     COPD (chronic obstructive pulmonary disease) (HCC)     Diabetes mellitus (HCC)     Diabetes mellitus, type 2 (HCC)     Headache     Hypothyroidism     Hypothyroidism     Intellectual disability     Pneumonia     Schizophrenia (HCC)     Scoliosis      ***    Interpretation per the Radiologist below, if available at the time of this note:    No orders to display     No results found.   No results found.    MEDICAL DECISION MAKING / ED COURSE:      PROCEDURES:   Procedures    Patient was given:  Medications - No data to display    CONSULTS: (Who and What was discussed)  None      Chronic Conditions affecting care: ***   has a past medical history of Abnormal radiograph (9/28/2015), Asthma, Atrial fibrillation (HCC), Bipolar 1 disorder (HCC), Conduct disorder, Conduct disturbance, COPD (chronic obstructive pulmonary disease) (HCC), Diabetes mellitus (HCC), Diabetes mellitus, type 2 (HCC), Headache, Hypothyroidism, Hypothyroidism, Intellectual disability, Pneumonia, Schizophrenia (HCC), Scoliosis, Scoliosis (1995), and Sleep apnea.     Records Reviewed (External and Source) ***    CC/HPI Summary, DDx, ED Course, and Reassessment: ***    Disposition Considerations (Tests not ordered but considered, Shared Decision Making, Pt Expectation of Test or Tx.): ***           The patient tolerated their visit well.  I evaluated the patient.  The physician was available for consultation as needed.  The patient and / or the family were informed of the results of any tests, a time was given to answer questions, a plan was proposed and they agreed with plan.     I am the Primary Clinician of Record.     CLINICAL IMPRESSION:  1. Acute otitis externa of right ear, unspecified type        DISPOSITION Decision To Discharge 06/27/2025 08:02:55 PM   DISPOSITION CONDITION Stable           PATIENT REFERRED TO:  No follow-up provider specified.    DISCHARGE MEDICATIONS:  New Prescriptions    NEOMYCIN-POLYMYXIN-HYDROCORTISONE (CORTISPORIN) 3.5-05687-5 OTIC SUSPENSION    Place 3 drops into the right ear 3 times daily for 10 days       DISCONTINUED MEDICATIONS:  Discontinued Medications    No medications on file              (Please note the MDM and HPI sections of this note were completed

## 2025-06-28 NOTE — ED NOTES
Discharge and education instructions reviewed. Patient and caregiver verbalized understanding, teach-back successful. Patient denied questions at this time. No acute distress noted. Patient instructed to follow-up as noted - return to emergency department if symptoms worsen. Patient verbalized understanding. Discharged per EDMD with discharge instructions.

## 2025-06-30 ASSESSMENT — ENCOUNTER SYMPTOMS
FACIAL SWELLING: 0
ABDOMINAL PAIN: 0
COUGH: 0
SORE THROAT: 0
SHORTNESS OF BREATH: 0
EYE PAIN: 0
BACK PAIN: 0
NAUSEA: 0
VOMITING: 0